# Patient Record
Sex: FEMALE | Race: WHITE | NOT HISPANIC OR LATINO
[De-identification: names, ages, dates, MRNs, and addresses within clinical notes are randomized per-mention and may not be internally consistent; named-entity substitution may affect disease eponyms.]

---

## 2023-09-13 PROBLEM — Z00.00 ENCOUNTER FOR PREVENTIVE HEALTH EXAMINATION: Status: ACTIVE | Noted: 2023-09-13

## 2023-09-18 ENCOUNTER — ASOB RESULT (OUTPATIENT)
Age: 33
End: 2023-09-18

## 2023-09-18 ENCOUNTER — APPOINTMENT (OUTPATIENT)
Dept: ANTEPARTUM | Facility: CLINIC | Age: 33
End: 2023-09-18
Payer: COMMERCIAL

## 2023-09-18 ENCOUNTER — TRANSCRIPTION ENCOUNTER (OUTPATIENT)
Age: 33
End: 2023-09-18

## 2023-09-18 PROCEDURE — 76811 OB US DETAILED SNGL FETUS: CPT

## 2024-01-24 ENCOUNTER — INPATIENT (INPATIENT)
Facility: HOSPITAL | Age: 34
LOS: 1 days | Discharge: HOME | End: 2024-01-26
Attending: OBSTETRICS & GYNECOLOGY | Admitting: OBSTETRICS & GYNECOLOGY
Payer: COMMERCIAL

## 2024-01-24 ENCOUNTER — TRANSCRIPTION ENCOUNTER (OUTPATIENT)
Age: 34
End: 2024-01-24

## 2024-01-24 VITALS — SYSTOLIC BLOOD PRESSURE: 134 MMHG | DIASTOLIC BLOOD PRESSURE: 89 MMHG | OXYGEN SATURATION: 99 % | HEART RATE: 99 BPM

## 2024-01-24 DIAGNOSIS — R03.0 ELEVATED BLOOD-PRESSURE READING, WITHOUT DIAGNOSIS OF HYPERTENSION: ICD-10-CM

## 2024-01-24 DIAGNOSIS — O26.899 OTHER SPECIFIED PREGNANCY RELATED CONDITIONS, UNSPECIFIED TRIMESTER: ICD-10-CM

## 2024-01-24 DIAGNOSIS — Z34.80 ENCOUNTER FOR SUPERVISION OF OTHER NORMAL PREGNANCY, UNSPECIFIED TRIMESTER: ICD-10-CM

## 2024-01-24 LAB
ALBUMIN SERPL ELPH-MCNC: 3.9 G/DL — SIGNIFICANT CHANGE UP (ref 3.3–5)
ALP SERPL-CCNC: 105 U/L — SIGNIFICANT CHANGE UP (ref 40–120)
ALT FLD-CCNC: 22 U/L — SIGNIFICANT CHANGE UP (ref 10–45)
ANION GAP SERPL CALC-SCNC: 13 MMOL/L — SIGNIFICANT CHANGE UP (ref 5–17)
APPEARANCE UR: ABNORMAL
APTT BLD: 25.4 SEC — SIGNIFICANT CHANGE UP (ref 24.5–35.6)
AST SERPL-CCNC: 22 U/L — SIGNIFICANT CHANGE UP (ref 10–40)
BACTERIA # UR AUTO: ABNORMAL /HPF
BASOPHILS # BLD AUTO: 0.01 K/UL — SIGNIFICANT CHANGE UP (ref 0–0.2)
BASOPHILS NFR BLD AUTO: 0.1 % — SIGNIFICANT CHANGE UP (ref 0–2)
BILIRUB SERPL-MCNC: 0.4 MG/DL — SIGNIFICANT CHANGE UP (ref 0.2–1.2)
BILIRUB UR-MCNC: ABNORMAL
BUN SERPL-MCNC: 7 MG/DL — SIGNIFICANT CHANGE UP (ref 7–23)
CALCIUM SERPL-MCNC: 8.8 MG/DL — SIGNIFICANT CHANGE UP (ref 8.4–10.5)
CAST: 4 /LPF — SIGNIFICANT CHANGE UP (ref 0–4)
CHLORIDE SERPL-SCNC: 106 MMOL/L — SIGNIFICANT CHANGE UP (ref 96–108)
CO2 SERPL-SCNC: 19 MMOL/L — LOW (ref 22–31)
COD CRY URNS QL: PRESENT
COLOR SPEC: SIGNIFICANT CHANGE UP
CREAT ?TM UR-MCNC: 268 MG/DL — SIGNIFICANT CHANGE UP
CREAT SERPL-MCNC: 0.5 MG/DL — SIGNIFICANT CHANGE UP (ref 0.5–1.3)
DIFF PNL FLD: ABNORMAL
EGFR: 128 ML/MIN/1.73M2 — SIGNIFICANT CHANGE UP
EOSINOPHIL # BLD AUTO: 0.08 K/UL — SIGNIFICANT CHANGE UP (ref 0–0.5)
EOSINOPHIL NFR BLD AUTO: 0.9 % — SIGNIFICANT CHANGE UP (ref 0–6)
FIBRINOGEN PPP-MCNC: 442 MG/DL — SIGNIFICANT CHANGE UP (ref 200–445)
GLUCOSE SERPL-MCNC: 103 MG/DL — HIGH (ref 70–99)
GLUCOSE UR QL: NEGATIVE MG/DL — SIGNIFICANT CHANGE UP
HCT VFR BLD CALC: 36.2 % — SIGNIFICANT CHANGE UP (ref 34.5–45)
HGB BLD-MCNC: 12.7 G/DL — SIGNIFICANT CHANGE UP (ref 11.5–15.5)
IMM GRANULOCYTES NFR BLD AUTO: 0.6 % — SIGNIFICANT CHANGE UP (ref 0–0.9)
INR BLD: 1.03 RATIO — SIGNIFICANT CHANGE UP (ref 0.85–1.18)
KETONES UR-MCNC: >=160 MG/DL
LDH SERPL L TO P-CCNC: 143 U/L — SIGNIFICANT CHANGE UP (ref 50–242)
LEUKOCYTE ESTERASE UR-ACNC: ABNORMAL
LYMPHOCYTES # BLD AUTO: 1.34 K/UL — SIGNIFICANT CHANGE UP (ref 1–3.3)
LYMPHOCYTES # BLD AUTO: 15 % — SIGNIFICANT CHANGE UP (ref 13–44)
MCHC RBC-ENTMCNC: 31.1 PG — SIGNIFICANT CHANGE UP (ref 27–34)
MCHC RBC-ENTMCNC: 35.1 GM/DL — SIGNIFICANT CHANGE UP (ref 32–36)
MCV RBC AUTO: 88.7 FL — SIGNIFICANT CHANGE UP (ref 80–100)
MONOCYTES # BLD AUTO: 0.76 K/UL — SIGNIFICANT CHANGE UP (ref 0–0.9)
MONOCYTES NFR BLD AUTO: 8.5 % — SIGNIFICANT CHANGE UP (ref 2–14)
NEUTROPHILS # BLD AUTO: 6.69 K/UL — SIGNIFICANT CHANGE UP (ref 1.8–7.4)
NEUTROPHILS NFR BLD AUTO: 74.9 % — SIGNIFICANT CHANGE UP (ref 43–77)
NITRITE UR-MCNC: NEGATIVE — SIGNIFICANT CHANGE UP
NRBC # BLD: 0 /100 WBCS — SIGNIFICANT CHANGE UP (ref 0–0)
PH UR: 6 — SIGNIFICANT CHANGE UP (ref 5–8)
PLATELET # BLD AUTO: 173 K/UL — SIGNIFICANT CHANGE UP (ref 150–400)
POTASSIUM SERPL-MCNC: 3.5 MMOL/L — SIGNIFICANT CHANGE UP (ref 3.5–5.3)
POTASSIUM SERPL-SCNC: 3.5 MMOL/L — SIGNIFICANT CHANGE UP (ref 3.5–5.3)
PROT ?TM UR-MCNC: 64 MG/DL — HIGH (ref 0–12)
PROT SERPL-MCNC: 6.7 G/DL — SIGNIFICANT CHANGE UP (ref 6–8.3)
PROT UR-MCNC: 30 MG/DL
PROT/CREAT UR-RTO: 0.2 RATIO — SIGNIFICANT CHANGE UP (ref 0–0.2)
PROTHROM AB SERPL-ACNC: 10.8 SEC — SIGNIFICANT CHANGE UP (ref 9.5–13)
RBC # BLD: 4.08 M/UL — SIGNIFICANT CHANGE UP (ref 3.8–5.2)
RBC # FLD: 14.2 % — SIGNIFICANT CHANGE UP (ref 10.3–14.5)
RBC CASTS # UR COMP ASSIST: 7 /HPF — HIGH (ref 0–4)
REVIEW: SIGNIFICANT CHANGE UP
SODIUM SERPL-SCNC: 138 MMOL/L — SIGNIFICANT CHANGE UP (ref 135–145)
SP GR SPEC: 1.02 — SIGNIFICANT CHANGE UP (ref 1–1.03)
SQUAMOUS # UR AUTO: 3 /HPF — SIGNIFICANT CHANGE UP (ref 0–5)
URATE SERPL-MCNC: 5.2 MG/DL — SIGNIFICANT CHANGE UP (ref 2.5–7)
UROBILINOGEN FLD QL: 1 MG/DL — SIGNIFICANT CHANGE UP (ref 0.2–1)
WBC # BLD: 8.93 K/UL — SIGNIFICANT CHANGE UP (ref 3.8–10.5)
WBC # FLD AUTO: 8.93 K/UL — SIGNIFICANT CHANGE UP (ref 3.8–10.5)
WBC UR QL: 5 /HPF — SIGNIFICANT CHANGE UP (ref 0–5)

## 2024-01-24 PROCEDURE — 86077 PHYS BLOOD BANK SERV XMATCH: CPT

## 2024-01-24 RX ORDER — OXYTOCIN 10 UNIT/ML
333.33 VIAL (ML) INJECTION
Qty: 20 | Refills: 0 | Status: DISCONTINUED | OUTPATIENT
Start: 2024-01-24 | End: 2024-01-24

## 2024-01-24 RX ORDER — KETOROLAC TROMETHAMINE 30 MG/ML
30 SYRINGE (ML) INJECTION ONCE
Refills: 0 | Status: DISCONTINUED | OUTPATIENT
Start: 2024-01-24 | End: 2024-01-25

## 2024-01-24 RX ORDER — HYDROCORTISONE 1 %
1 OINTMENT (GRAM) TOPICAL EVERY 6 HOURS
Refills: 0 | Status: DISCONTINUED | OUTPATIENT
Start: 2024-01-24 | End: 2024-01-26

## 2024-01-24 RX ORDER — ACETAMINOPHEN 500 MG
975 TABLET ORAL
Refills: 0 | Status: DISCONTINUED | OUTPATIENT
Start: 2024-01-24 | End: 2024-01-26

## 2024-01-24 RX ORDER — BENZOCAINE 10 %
1 GEL (GRAM) MUCOUS MEMBRANE EVERY 6 HOURS
Refills: 0 | Status: DISCONTINUED | OUTPATIENT
Start: 2024-01-24 | End: 2024-01-26

## 2024-01-24 RX ORDER — IBUPROFEN 200 MG
600 TABLET ORAL EVERY 6 HOURS
Refills: 0 | Status: COMPLETED | OUTPATIENT
Start: 2024-01-24 | End: 2024-12-22

## 2024-01-24 RX ORDER — CHLORHEXIDINE GLUCONATE 213 G/1000ML
1 SOLUTION TOPICAL DAILY
Refills: 0 | Status: DISCONTINUED | OUTPATIENT
Start: 2024-01-24 | End: 2024-01-24

## 2024-01-24 RX ORDER — CITRIC ACID/SODIUM CITRATE 300-500 MG
15 SOLUTION, ORAL ORAL EVERY 6 HOURS
Refills: 0 | Status: DISCONTINUED | OUTPATIENT
Start: 2024-01-24 | End: 2024-01-24

## 2024-01-24 RX ORDER — PRAMOXINE HYDROCHLORIDE 150 MG/15G
1 AEROSOL, FOAM RECTAL EVERY 4 HOURS
Refills: 0 | Status: DISCONTINUED | OUTPATIENT
Start: 2024-01-24 | End: 2024-01-26

## 2024-01-24 RX ORDER — TETANUS TOXOID, REDUCED DIPHTHERIA TOXOID AND ACELLULAR PERTUSSIS VACCINE, ADSORBED 5; 2.5; 8; 8; 2.5 [IU]/.5ML; [IU]/.5ML; UG/.5ML; UG/.5ML; UG/.5ML
0.5 SUSPENSION INTRAMUSCULAR ONCE
Refills: 0 | Status: DISCONTINUED | OUTPATIENT
Start: 2024-01-24 | End: 2024-01-26

## 2024-01-24 RX ORDER — SODIUM CHLORIDE 9 MG/ML
500 INJECTION, SOLUTION INTRAVENOUS ONCE
Refills: 0 | Status: COMPLETED | OUTPATIENT
Start: 2024-01-24 | End: 2024-01-24

## 2024-01-24 RX ORDER — DIPHENHYDRAMINE HCL 50 MG
25 CAPSULE ORAL EVERY 6 HOURS
Refills: 0 | Status: DISCONTINUED | OUTPATIENT
Start: 2024-01-24 | End: 2024-01-26

## 2024-01-24 RX ORDER — DIBUCAINE 1 %
1 OINTMENT (GRAM) RECTAL EVERY 6 HOURS
Refills: 0 | Status: DISCONTINUED | OUTPATIENT
Start: 2024-01-24 | End: 2024-01-26

## 2024-01-24 RX ORDER — OXYCODONE HYDROCHLORIDE 5 MG/1
5 TABLET ORAL
Refills: 0 | Status: DISCONTINUED | OUTPATIENT
Start: 2024-01-24 | End: 2024-01-26

## 2024-01-24 RX ORDER — OXYCODONE HYDROCHLORIDE 5 MG/1
5 TABLET ORAL ONCE
Refills: 0 | Status: DISCONTINUED | OUTPATIENT
Start: 2024-01-24 | End: 2024-01-26

## 2024-01-24 RX ORDER — LANOLIN
1 OINTMENT (GRAM) TOPICAL EVERY 6 HOURS
Refills: 0 | Status: DISCONTINUED | OUTPATIENT
Start: 2024-01-24 | End: 2024-01-26

## 2024-01-24 RX ORDER — SODIUM CHLORIDE 9 MG/ML
1000 INJECTION, SOLUTION INTRAVENOUS
Refills: 0 | Status: DISCONTINUED | OUTPATIENT
Start: 2024-01-24 | End: 2024-01-24

## 2024-01-24 RX ORDER — SIMETHICONE 80 MG/1
80 TABLET, CHEWABLE ORAL EVERY 4 HOURS
Refills: 0 | Status: DISCONTINUED | OUTPATIENT
Start: 2024-01-24 | End: 2024-01-26

## 2024-01-24 RX ORDER — AER TRAVELER 0.5 G/1
1 SOLUTION RECTAL; TOPICAL EVERY 4 HOURS
Refills: 0 | Status: DISCONTINUED | OUTPATIENT
Start: 2024-01-24 | End: 2024-01-26

## 2024-01-24 RX ORDER — SODIUM CHLORIDE 9 MG/ML
3 INJECTION INTRAMUSCULAR; INTRAVENOUS; SUBCUTANEOUS EVERY 8 HOURS
Refills: 0 | Status: DISCONTINUED | OUTPATIENT
Start: 2024-01-24 | End: 2024-01-26

## 2024-01-24 RX ORDER — OXYTOCIN 10 UNIT/ML
41.67 VIAL (ML) INJECTION
Qty: 20 | Refills: 0 | Status: DISCONTINUED | OUTPATIENT
Start: 2024-01-24 | End: 2024-01-26

## 2024-01-24 RX ORDER — OXYTOCIN 10 UNIT/ML
4 VIAL (ML) INJECTION
Qty: 30 | Refills: 0 | Status: DISCONTINUED | OUTPATIENT
Start: 2024-01-24 | End: 2024-01-24

## 2024-01-24 RX ORDER — MAGNESIUM HYDROXIDE 400 MG/1
30 TABLET, CHEWABLE ORAL
Refills: 0 | Status: DISCONTINUED | OUTPATIENT
Start: 2024-01-24 | End: 2024-01-26

## 2024-01-24 RX ADMIN — SODIUM CHLORIDE 125 MILLILITER(S): 9 INJECTION, SOLUTION INTRAVENOUS at 11:15

## 2024-01-24 RX ADMIN — SODIUM CHLORIDE 125 MILLILITER(S): 9 INJECTION, SOLUTION INTRAVENOUS at 10:50

## 2024-01-24 RX ADMIN — SODIUM CHLORIDE 500 MILLILITER(S): 9 INJECTION, SOLUTION INTRAVENOUS at 17:45

## 2024-01-24 RX ADMIN — Medication 4 MILLIUNIT(S)/MIN: at 11:55

## 2024-01-24 NOTE — DISCHARGE NOTE OB - CARE PLAN
Assessment and plan of treatment:	Pt had a vaginal delivery of female infant and is stable for discharge on POD   1 Principal Discharge DX:	Normal vaginal delivery  Assessment and plan of treatment:	Pt had a vaginal delivery of female infant and is stable for discharge on POD

## 2024-01-24 NOTE — OB PROVIDER H&P - NSLOWPPHRISK_OBGYN_A_OB
No previous uterine incision/[place] Pregnancy/Less than or equal to 4 previous vaginal births/No known bleeding disorder/No history of postpartum hemorrhage/No other PPH risks indicated

## 2024-01-24 NOTE — OB RN DELIVERY SUMMARY - NSSELHIDDEN_OBGYN_ALL_OB_FT
[NS_DeliveryAttending1_OBGYN_ALL_OB_FT:[id number]],[NS_DeliveryAssist1_OBGYN_ALL_OB_FT:[id number]=],[NS_DeliveryRN_OBGYN_ALL_OB_FT:[id number]=]

## 2024-01-24 NOTE — OB PROVIDER DELIVERY SUMMARY - NSPROVIDERDELIVERYNOTE_OBGYN_ALL_OB_FT
Spontaneous vaginal delivery of liveborn infant over protected peritoneum. Head, shoulders, and body delivered easily. Infant was suctioned. No meconium. 1 minute delayed cord clamping was performed. Cord clamped and cut and infant passed to mother. Placenta delivered intact with a 3 vessel cord. Fundal massage was given and uterine fundus was found to be firm. Vaginal exam revealed an intact cervix, vaginal walls, and sulci. Patient had a 2nd degree laceration in the perineum that was repaired with 2.0 vicryl suture and bilateral labial tears. Excellent hemostasis was noted. Patient was stable and went to recovery. Count was correct x2.     EBL: 400cc   APGAR: 9/9 Spontaneous vaginal delivery of liveborn infant over protected peritoneum. Head, shoulders, and body delivered easily. Infant was suctioned. No meconium. 1 minute delayed cord clamping was performed. Cord clamped and cut and infant passed to mother. Placenta delivered intact with a 3 vessel cord. Fundal massage was given and uterine fundus was found to be firm. Vaginal exam revealed an intact cervix, vaginal walls, and sulci. Patient had a 2nd degree laceration in the perineum that was repaired with 2.0 vicryl suture and bilateral labial tears. Excellent hemostasis was noted. Patient was stable and went to recovery. Count was correct x2.   Bedside sono confirmed no retained membranes    EBL: 400cc   APGAR: 9/9

## 2024-01-24 NOTE — OB PROVIDER DELIVERY SUMMARY - NSSELHIDDEN_OBGYN_ALL_OB_FT
[NS_DeliveryAttending1_OBGYN_ALL_OB_FT:[id number]],[NS_DeliveryAssist1_OBGYN_ALL_OB_FT:[id number]],[NS_DeliveryRN_OBGYN_ALL_OB_FT:[id number]=]

## 2024-01-24 NOTE — OB RN PATIENT PROFILE - INFANT HOME WITH MOTHER, OB PROFILE
Paperwork received for prior auth for Cyclobenzaprine. Done through CoverMyMeds. Awaiting authorization. yes

## 2024-01-24 NOTE — OB PROVIDER H&P - NSHPPHYSICALEXAM_GEN_ALL_CORE
ICU Vital Signs Last 24 Hrs  T(C): 36.7 (24 Jan 2024 10:11), Max: 36.7 (24 Jan 2024 09:19)  T(F): 98.1 (24 Jan 2024 10:11), Max: 98.1 (24 Jan 2024 09:19)  HR: 76 (24 Jan 2024 10:41) (76 - 108)  BP: 119/79 (24 Jan 2024 10:41) (114/76 - 137/93)  RR: 18 (24 Jan 2024 10:11) (17 - 18)  SpO2: 95% (24 Jan 2024 10:29) (93% - 99%)    O2 Parameters below as of 24 Jan 2024 10:11  Patient On (Oxygen Delivery Method): room air  gen: NAD, breathing through contractions  cards: clear S1S2, mild tachycardia, apical 106   pulm: cta b/l   abd: soft, gravid.   ve: 3/80/-2, soft, posterior, engaged vertex    EFM: cat 1   toco: q 5 min

## 2024-01-24 NOTE — OB PROVIDER H&P - PROBLEM SELECTOR PLAN 1
admit  routine labs  Iv access and IV fluids  Bicitra  Cont efm/toco.  Anesthesia consult for epidural.  After comfortable, augmentation w/ pitocin & AROM.   Expectant .

## 2024-01-24 NOTE — OB RN PATIENT PROFILE - NUTRITION PROFILE
Pt arrives with cc of vaginal bleeding with clots today. Pt states she has hx of polycystic ovaries. Pt did receive pap smear today. Pt states she has not had a period in months, denies chance of pregnancy. Pt also endorses stabbing pelvic pain.  
No indicators present

## 2024-01-24 NOTE — DISCHARGE NOTE OB - HOSPITAL COURSE
Patient was admitted for labor augmentation at 39 weeks  She had a vaginal delivery of female infant and is stable for discharge on PPD

## 2024-01-24 NOTE — OB RN DELIVERY SUMMARY - NS_SEPSISRSKCALC_OBGYN_ALL_OB_FT
EOS calculated successfully. EOS Risk Factor: 0.5/1000 live births ([facility] national incidence); GA=39w;Temp=99.5; ROM=10.583; GBS='Negative'; Antibiotics='No antibiotics or any antibiotics < 2 hrs prior to birth'

## 2024-01-24 NOTE — OB PROVIDER H&P - HISTORY OF PRESENT ILLNESS
34 y/o  ANDREW 24 presenting at 39 weeks ga presenting in labor with 7/10 painful contractions every 5 minutes since 3 am.  Pt reports good fetal movement. Denies vb or lof.  GBS neg. EFW 6lb x 2 weeks ago.  PNC uncomplicated.  No h/o elevated BP's in pregnancy though pt reports when she is in the office she is nervous and it is slightly elevated.     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

## 2024-01-24 NOTE — OB PROVIDER LABOR PROGRESS NOTE - NS_SUBJECTIVE/OBJECTIVE_OBGYN_ALL_OB_FT
ELLY AHUMADA Note:    Pt seen and examined for progress. Pt comfortable w/ epidural and ferguson placement. Pt had a few episodes of hypotension which was corrected with ephedrine.   +FM.  Denies VB or LOF.       O:  Vital Signs Last 24 Hrs  T(C): 36.7 (24 Jan 2024 11:28), Max: 36.7 (24 Jan 2024 09:19)  T(F): 98.1 (24 Jan 2024 10:11), Max: 98.1 (24 Jan 2024 09:19)  HR: 90 (24 Jan 2024 11:46) (60 - 110)  BP: 123/73 (24 Jan 2024 11:46) (68/38 - 146/90)  RR: 18 (24 Jan 2024 11:28) (17 - 18)  SpO2: 98% (24 Jan 2024 11:41) (93% - 100%)    Parameters below as of 24 Jan 2024 10:11  Patient On (Oxygen Delivery Method): room air    gen: NAD  abd: soft, gravid. nontender  ve: 4/90/-2  w/ AROM clear fluid

## 2024-01-24 NOTE — DISCHARGE NOTE OB - PATIENT PORTAL LINK FT
You can access the FollowMyHealth Patient Portal offered by [facility] by registering at the following website: [email or web address]. By joining [employer]’s FollowMyHealth portal, you will also be able to view your health information using other applications (apps) compatible with our system.

## 2024-01-24 NOTE — OB PROVIDER LABOR PROGRESS NOTE - ASSESSMENT
begin pitocin augmentation.  cont efm/toco  hellp labs pending - CBC normal.     BRANDYN Hernández
-Anticipate

## 2024-01-24 NOTE — OB PROVIDER H&P - ATTENDING COMMENTS
English
34 y/o  ANDREW 24 presenting at 39 weeks ga presenting in labor with 7/10 painful contractions every 5 minutes since 3 am.  Pt reports good fetal movement. Denies vb or lof.  GBS neg. EFW 6lb x 2 weeks ago.  PNC uncomplicated.      VE was 3 cm on admission  Pt wants labor augmentation    VSS AF  NST:  + accels ,no decels-reactive  toco: ctx q 3 min  HIV NR/RPR/NR/Hep B and C neg  A/P: Labor augmentation at term, neg GBS  -epidural  -pitocin  -consents signed  Adonay Stanton MD

## 2024-01-24 NOTE — DISCHARGE NOTE OB - NS MD DC FALL RISK RISK
For information on Fall & Injury Prevention, visit: [email or web address] OR  [email or web address] OR  https://www.cdc.gov/steadi/patient.html

## 2024-01-24 NOTE — OB RN DELIVERY SUMMARY - NS_RNDELIVATTEST_OBGYN_ALL_OB
Problem: Adult Inpatient Plan of Care  Goal: Plan of Care Review  Outcome: Ongoing (interventions implemented as appropriate)  AAOx3. Bed remains low, locked and call bell within reach. Patient verbalized understanding to call for any needs or assistance. PRN pain medication administered per MD orders. PCA discontinued today. Patient to IR for Thoracentesis and Paracentesis. PT/OT worked with patient. IV antibiotics administered per MD orders. Will continue to monitor patient.         OB Provider reported that the vagina was inspected and no foreign body was found/Laps, needles and instrument count was correct

## 2024-01-24 NOTE — OB PROVIDER LABOR PROGRESS NOTE - NS_SUBJECTIVE/OBJECTIVE_OBGYN_ALL_OB_FT
Patient and seen and examined at bedside, c/o increased rectal pressure.     Vital Signs Last 24 Hrs  T(C): 36.8 (24 Jan 2024 18:16), Max: 37.5 (24 Jan 2024 15:15)  T(F): 98.24 (24 Jan 2024 18:16), Max: 99.5 (24 Jan 2024 15:15)  HR: 97 (24 Jan 2024 18:47) (60 - 110)  BP: 141/86 (24 Jan 2024 18:47) (68/38 - 146/90)  RR: 18 (24 Jan 2024 15:16) (17 - 18)  SpO2: 100% (24 Jan 2024 18:46) (93% - 100%) Patient and seen and examined at bedside, c/o increased rectal pressure. Patient has an epidural in place.     Vital Signs Last 24 Hrs  T(C): 36.8 (24 Jan 2024 18:16), Max: 37.5 (24 Jan 2024 15:15)  T(F): 98.24 (24 Jan 2024 18:16), Max: 99.5 (24 Jan 2024 15:15)  HR: 97 (24 Jan 2024 18:47) (60 - 110)  BP: 141/86 (24 Jan 2024 18:47) (68/38 - 146/90)  RR: 18 (24 Jan 2024 15:16) (17 - 18)  SpO2: 100% (24 Jan 2024 18:46) (93% - 100%)

## 2024-01-24 NOTE — DISCHARGE NOTE OB - CARE PROVIDER_API CALL
Adonay Stanton  Obstetrics and Gynecology  [address], [address]  [place] [address]  Phone: [phone]  Fax: [phone]  Follow Up Time:

## 2024-01-24 NOTE — OB PROVIDER H&P - HIV: DATE, OB PROFILE
Chief Complaint   Patient presents with     Musculoskeletal Problem     Patient complains of boil on left leg        Initial /89 (BP Location: Left arm, Patient Position: Chair, Cuff Size: Adult Regular)  Pulse 126  Temp 98.6  F (37  C) (Oral)  Wt 204 lb 9.6 oz (92.8 kg)  SpO2 94% There is no height or weight on file to calculate BMI.  Medication Reconciliation: complete       Maryanne Herman     04-Jan-2024

## 2024-01-24 NOTE — OB PROVIDER IHI INDUCTION/AUGMENTATION NOTE - NSCHECKLIST_OBGYN_ALL_OB_CAL
5 Otolaryngologist Procedure Text (A): After obtaining clear surgical margins the patient was sent to otolaryngology for surgical repair.  The patient understands they will receive post-surgical care and follow-up from the referring physician's office.

## 2024-01-24 NOTE — PRE-ANESTHESIA EVALUATION ADULT - NSANTHAGERD_ENT_A_CORE
Patient is currently pregnant, in a methadone program and she has not had a potassium run since May.  Will discuss with Dr. Montano.   No

## 2024-01-24 NOTE — OB RN PATIENT PROFILE - WEIGHT: PREPREGNANCY IN LBS
Anesthesia Evaluation     NPO Solid Status: > 8 hours  NPO Liquid Status: < 2 hours           Airway   Mallampati: III  TM distance: <3 FB  Neck ROM: full  Difficult intubation highly probable  Dental - normal exam     Pulmonary - normal exam   Cardiovascular - normal exam        Neuro/Psych  GI/Hepatic/Renal/Endo    (+) morbid obesity,      Musculoskeletal     Abdominal   (+) obese,    Substance History      OB/GYN          Other                        Anesthesia Plan    ASA 2     epidural     Anesthetic plan and risks discussed with patient.      
132

## 2024-01-25 LAB — KLEIHAUER-BETKE CALCULATION: 0 % — SIGNIFICANT CHANGE UP (ref 0–0.2)

## 2024-01-25 RX ORDER — IBUPROFEN 200 MG
600 TABLET ORAL EVERY 6 HOURS
Refills: 0 | Status: DISCONTINUED | OUTPATIENT
Start: 2024-01-25 | End: 2024-01-26

## 2024-01-25 RX ADMIN — Medication 30 MILLIGRAM(S): at 00:33

## 2024-01-25 RX ADMIN — SODIUM CHLORIDE 3 MILLILITER(S): 9 INJECTION INTRAMUSCULAR; INTRAVENOUS; SUBCUTANEOUS at 06:11

## 2024-01-25 RX ADMIN — Medication 600 MILLIGRAM(S): at 17:35

## 2024-01-25 RX ADMIN — Medication 600 MILLIGRAM(S): at 18:05

## 2024-01-25 NOTE — PROGRESS NOTE ADULT - ASSESSMENT
32y/o  PPD#1 from  c/b gHTN. Overall, patient stable and recovering well postpartum.    #L Leg Numbness  - Anesthesia will re-eval    #gHTN  - BPs overnight wnl  - denies severe features   - baseline HELLP labs wnl, P/C 0.2; repeat prn   - no antihypertensives on board  - continue to monitor     #Postpartum state  - Continue with po analgesia  - Increase ambulation  - Continue regular diet  - IV lock  - No labs    Nancy Wheat  PGY-1

## 2024-01-25 NOTE — PROGRESS NOTE ADULT - ATTENDING COMMENTS
PT AMBULATING. DOING WELL  VSS  FIRM FUNDUS.  PPD # 1/2  STABLE  PT DELIVERED LATE LAST EVENING SO ANTICIPATE D/C IN THE MORNING    J LENARDARO

## 2024-01-25 NOTE — PROGRESS NOTE ADULT - SUBJECTIVE AND OBJECTIVE BOX
R1 Progress Note    Patient seen and examined at bedside while still in PACU, no acute overnight events. No acute complaints, pain well controlled. Still feeling like her left leg is numb and weak. Patient is not yet ambulating due to her leg, and has not voided since she delivered. She is tolerating regular diet. Denies CP, SOB, N/V, HA, vision changes. Bleeding minimal.    Vital Signs Last 24 Hours  T(C): 36.9 (01-25-24 @ 01:00), Max: 37.9 (01-24-24 @ 22:55)  HR: 88 (01-25-24 @ 03:00) (60 - 122)  BP: 113/65 (01-25-24 @ 03:00) (68/38 - 146/90)  RR: 18 (01-25-24 @ 01:30) (17 - 18)  SpO2: 97% (01-25-24 @ 03:00) (90% - 100%)    Physical Exam:  General: NAD  Abdomen: Soft, non-tender, non-distended, fundus firm  Extremities: decreased strength and sensation left thigh, normal strength and sensation in right and left lower extremities                     Pelvic: Lochia wnl    Labs:    Blood Type: A Negative  Antibody Screen: Positive               12.7   8.93  )-----------( 173      ( 01-24 @ 11:25 )             36.2         MEDICATIONS  (STANDING):  acetaminophen     Tablet .. 975 milliGRAM(s) Oral <User Schedule>  diphtheria/tetanus/pertussis (acellular) Vaccine (Adacel) 0.5 milliLiter(s) IntraMuscular once  ibuprofen  Tablet. 600 milliGRAM(s) Oral every 6 hours  oxytocin Infusion 41.667 milliUNIT(s)/Min (125 mL/Hr) IV Continuous <Continuous>  prenatal multivitamin 1 Tablet(s) Oral daily  sodium chloride 0.9% lock flush 3 milliLiter(s) IV Push every 8 hours    MEDICATIONS  (PRN):  benzocaine 20%/menthol 0.5% Spray 1 Spray(s) Topical every 6 hours PRN for Perineal discomfort  dibucaine 1% Ointment 1 Application(s) Topical every 6 hours PRN Perineal discomfort  diphenhydrAMINE 25 milliGRAM(s) Oral every 6 hours PRN Pruritus  hydrocortisone 1% Cream 1 Application(s) Topical every 6 hours PRN Moderate Pain (4-6)  lanolin Ointment 1 Application(s) Topical every 6 hours PRN nipple soreness  magnesium hydroxide Suspension 30 milliLiter(s) Oral two times a day PRN Constipation  oxyCODONE    IR 5 milliGRAM(s) Oral every 3 hours PRN Moderate to Severe Pain (4-10)  oxyCODONE    IR 5 milliGRAM(s) Oral once PRN Moderate to Severe Pain (4-10)  pramoxine 1%/zinc 5% Cream 1 Application(s) Topical every 4 hours PRN Moderate Pain (4-6)  simethicone 80 milliGRAM(s) Chew every 4 hours PRN Gas  witch hazel Pads 1 Application(s) Topical every 4 hours PRN Perineal discomfort

## 2024-01-26 VITALS
DIASTOLIC BLOOD PRESSURE: 67 MMHG | SYSTOLIC BLOOD PRESSURE: 103 MMHG | RESPIRATION RATE: 18 BRPM | OXYGEN SATURATION: 98 % | TEMPERATURE: 98 F | HEART RATE: 59 BPM

## 2024-01-26 LAB — T PALLIDUM AB TITR SER: NEGATIVE — SIGNIFICANT CHANGE UP

## 2024-01-26 PROCEDURE — 85460 HEMOGLOBIN FETAL: CPT

## 2024-01-26 PROCEDURE — 85730 THROMBOPLASTIN TIME PARTIAL: CPT

## 2024-01-26 PROCEDURE — 85025 COMPLETE CBC W/AUTO DIFF WBC: CPT

## 2024-01-26 PROCEDURE — 85384 FIBRINOGEN ACTIVITY: CPT

## 2024-01-26 PROCEDURE — 86850 RBC ANTIBODY SCREEN: CPT

## 2024-01-26 PROCEDURE — 83615 LACTATE (LD) (LDH) ENZYME: CPT

## 2024-01-26 PROCEDURE — 85610 PROTHROMBIN TIME: CPT

## 2024-01-26 PROCEDURE — 36415 COLL VENOUS BLD VENIPUNCTURE: CPT

## 2024-01-26 PROCEDURE — 86870 RBC ANTIBODY IDENTIFICATION: CPT

## 2024-01-26 PROCEDURE — 84156 ASSAY OF PROTEIN URINE: CPT

## 2024-01-26 PROCEDURE — 86901 BLOOD TYPING SEROLOGIC RH(D): CPT

## 2024-01-26 PROCEDURE — 86900 BLOOD TYPING SEROLOGIC ABO: CPT

## 2024-01-26 PROCEDURE — 84550 ASSAY OF BLOOD/URIC ACID: CPT

## 2024-01-26 PROCEDURE — 81001 URINALYSIS AUTO W/SCOPE: CPT

## 2024-01-26 PROCEDURE — 82570 ASSAY OF URINE CREATININE: CPT

## 2024-01-26 PROCEDURE — 59050 FETAL MONITOR W/REPORT: CPT

## 2024-01-26 PROCEDURE — 86780 TREPONEMA PALLIDUM: CPT

## 2024-01-26 PROCEDURE — 80053 COMPREHEN METABOLIC PANEL: CPT

## 2024-01-26 RX ORDER — DIBUCAINE 1 %
1 OINTMENT (GRAM) RECTAL
Qty: 0 | Refills: 0 | DISCHARGE
Start: 2024-01-26

## 2024-01-26 RX ORDER — IBUPROFEN 200 MG
1 TABLET ORAL
Qty: 0 | Refills: 0 | DISCHARGE
Start: 2024-01-26

## 2024-01-26 RX ORDER — ACETAMINOPHEN 500 MG
3 TABLET ORAL
Qty: 0 | Refills: 0 | DISCHARGE
Start: 2024-01-26

## 2024-01-26 RX ADMIN — Medication 600 MILLIGRAM(S): at 12:29

## 2024-01-26 RX ADMIN — Medication 975 MILLIGRAM(S): at 08:29

## 2024-01-26 NOTE — PROGRESS NOTE ADULT - SUBJECTIVE AND OBJECTIVE BOX
R1 Progress Note    Patient seen and examined at bedside, no acute overnight events. No acute complaints, pain well controlled. Patient is ambulating, voiding spontaneously, passing gas, and tolerating regular diet. Denies CP, SOB, N/V, HA, blurred vision. Bleeding minimal.    Vital Signs Last 24 Hours  T(C): 36.7 (01-25-24 @ 17:38), Max: 36.7 (01-25-24 @ 05:30)  HR: 83 (01-25-24 @ 17:38) (77 - 94)  BP: 121/86 (01-25-24 @ 17:38) (115/73 - 121/86)  RR: 18 (01-25-24 @ 17:38) (18 - 18)  SpO2: 98% (01-25-24 @ 17:38) (98% - 100%)    Physical Exam:  General: NAD  Abdomen: Soft, non-tender, non-distended, fundus firm  Pelvic: Lochia wnl    Labs:    Blood Type: A Negative  Antibody Screen: Positive  RPR: Negative               12.7   8.93  )-----------( 173      ( 01-24 @ 11:25 )             36.2         MEDICATIONS  (STANDING):  acetaminophen     Tablet .. 975 milliGRAM(s) Oral <User Schedule>  diphtheria/tetanus/pertussis (acellular) Vaccine (Adacel) 0.5 milliLiter(s) IntraMuscular once  ibuprofen  Tablet. 600 milliGRAM(s) Oral every 6 hours  oxytocin Infusion 41.667 milliUNIT(s)/Min (125 mL/Hr) IV Continuous <Continuous>  prenatal multivitamin 1 Tablet(s) Oral daily  sodium chloride 0.9% lock flush 3 milliLiter(s) IV Push every 8 hours    MEDICATIONS  (PRN):  benzocaine 20%/menthol 0.5% Spray 1 Spray(s) Topical every 6 hours PRN for Perineal discomfort  dibucaine 1% Ointment 1 Application(s) Topical every 6 hours PRN Perineal discomfort  diphenhydrAMINE 25 milliGRAM(s) Oral every 6 hours PRN Pruritus  hydrocortisone 1% Cream 1 Application(s) Topical every 6 hours PRN Moderate Pain (4-6)  lanolin Ointment 1 Application(s) Topical every 6 hours PRN nipple soreness  magnesium hydroxide Suspension 30 milliLiter(s) Oral two times a day PRN Constipation  oxyCODONE    IR 5 milliGRAM(s) Oral every 3 hours PRN Moderate to Severe Pain (4-10)  oxyCODONE    IR 5 milliGRAM(s) Oral once PRN Moderate to Severe Pain (4-10)  pramoxine 1%/zinc 5% Cream 1 Application(s) Topical every 4 hours PRN Moderate Pain (4-6)  simethicone 80 milliGRAM(s) Chew every 4 hours PRN Gas  witch hazel Pads 1 Application(s) Topical every 4 hours PRN Perineal discomfort

## 2024-01-26 NOTE — PROGRESS NOTE ADULT - ASSESSMENT
32y/o  PPD#2 from  c/b gHTN. Overall, patient stable and recovering well postpartum.    #gHTN  - BPs overnight wnl  - denies severe features   - baseline HELLP labs wnl, P/C 0.2; repeat labs prn   - no antihypertensives on board  - continue to monitor     #Postpartum state  - Continue with po analgesia  - Increase ambulation  - Continue regular diet  - IV lock  - No labs    Nancy Wheat  PGY-1

## 2024-01-26 NOTE — PROGRESS NOTE ADULT - SUBJECTIVE AND OBJECTIVE BOX
PPD#2- ATTENDING NOTE    S: Patient doing well. Minimal lochia. Pain controlled.    O: Vital Signs Last 24 Hrs  T(C): 36.5 (2024 05:18), Max: 36.7 (2024 17:38)  T(F): 97.7 (2024 05:18), Max: 98.1 (2024 17:38)  HR: 59 (2024 05:18) (59 - 84)  BP: 103/67 (2024 05:18) (103/67 - 121/86)  BP(mean): --  RR: 18 (2024 05:18) (18 - 18)  SpO2: 98% (2024 05:18) (98% - 100%)    Parameters below as of 2024 05:18  Patient On (Oxygen Delivery Method): room air        Gen: NAD  Abd: soft, NT, ND, fundus firm below umbilicus  Lochia: moderate  Ext: no tenderness, no hyper reflexia  Voiding well    Labs:    ABO Interpretation: A (24 @ 11:09)  Rh Interpretation: Negative (24 @ 11:09)  ABO Interpretation: A (24 @ 11:09)  Rh Interpretation: Negative (24 @ 11:09)   Antibody ScreenPositive                        12.7   8.93  )-----------( 173      ( 2024 11:25 )             36.2       A: 33y PPD# s/p  doing well.    Plan:  Analgesia prn  Regular diet  Discharge instruction given  F/U 6 weeks  rhogam given    Office [phone]  Dr. Ochoa

## 2024-01-28 ENCOUNTER — INPATIENT (INPATIENT)
Facility: HOSPITAL | Age: 34
LOS: 1 days | Discharge: HOME | DRG: 776 | End: 2024-01-30
Attending: OBSTETRICS & GYNECOLOGY | Admitting: OBSTETRICS & GYNECOLOGY
Payer: COMMERCIAL

## 2024-01-28 VITALS
HEART RATE: 62 BPM | RESPIRATION RATE: 20 BRPM | DIASTOLIC BLOOD PRESSURE: 97 MMHG | HEIGHT: 66 IN | SYSTOLIC BLOOD PRESSURE: 170 MMHG | WEIGHT: 139.99 LBS | OXYGEN SATURATION: 100 % | TEMPERATURE: 98 F

## 2024-01-28 LAB
ALBUMIN SERPL ELPH-MCNC: 3.6 G/DL — SIGNIFICANT CHANGE UP (ref 3.3–5)
ALP SERPL-CCNC: 106 U/L — SIGNIFICANT CHANGE UP (ref 40–120)
ALT FLD-CCNC: 28 U/L — SIGNIFICANT CHANGE UP (ref 10–45)
ANION GAP SERPL CALC-SCNC: 13 MMOL/L — SIGNIFICANT CHANGE UP (ref 5–17)
APTT BLD: 28.1 SEC — SIGNIFICANT CHANGE UP (ref 24.5–35.6)
AST SERPL-CCNC: 20 U/L — SIGNIFICANT CHANGE UP (ref 10–40)
BASOPHILS # BLD AUTO: 0.05 K/UL — SIGNIFICANT CHANGE UP (ref 0–0.2)
BASOPHILS NFR BLD AUTO: 0.4 % — SIGNIFICANT CHANGE UP (ref 0–2)
BILIRUB DIRECT SERPL-MCNC: <0.1 MG/DL — SIGNIFICANT CHANGE UP (ref 0–0.3)
BILIRUB INDIRECT FLD-MCNC: >0.1 MG/DL — LOW (ref 0.2–1)
BILIRUB SERPL-MCNC: 0.2 MG/DL — SIGNIFICANT CHANGE UP (ref 0.2–1.2)
BUN SERPL-MCNC: 10 MG/DL — SIGNIFICANT CHANGE UP (ref 7–23)
CALCIUM SERPL-MCNC: 8.6 MG/DL — SIGNIFICANT CHANGE UP (ref 8.4–10.5)
CHLORIDE SERPL-SCNC: 104 MMOL/L — SIGNIFICANT CHANGE UP (ref 96–108)
CO2 SERPL-SCNC: 21 MMOL/L — LOW (ref 22–31)
CREAT SERPL-MCNC: 0.55 MG/DL — SIGNIFICANT CHANGE UP (ref 0.5–1.3)
EGFR: 125 ML/MIN/1.73M2 — SIGNIFICANT CHANGE UP
EOSINOPHIL # BLD AUTO: 0.38 K/UL — SIGNIFICANT CHANGE UP (ref 0–0.5)
EOSINOPHIL NFR BLD AUTO: 3.4 % — SIGNIFICANT CHANGE UP (ref 0–6)
FIBRINOGEN PPP-MCNC: 401 MG/DL — SIGNIFICANT CHANGE UP (ref 200–445)
GLUCOSE SERPL-MCNC: 92 MG/DL — SIGNIFICANT CHANGE UP (ref 70–99)
HCT VFR BLD CALC: 31.3 % — LOW (ref 34.5–45)
HGB BLD-MCNC: 10.5 G/DL — LOW (ref 11.5–15.5)
IMM GRANULOCYTES NFR BLD AUTO: 0.5 % — SIGNIFICANT CHANGE UP (ref 0–0.9)
INR BLD: 0.99 RATIO — SIGNIFICANT CHANGE UP (ref 0.85–1.18)
LDH SERPL L TO P-CCNC: 186 U/L — SIGNIFICANT CHANGE UP (ref 50–242)
LYMPHOCYTES # BLD AUTO: 27.5 % — SIGNIFICANT CHANGE UP (ref 13–44)
LYMPHOCYTES # BLD AUTO: 3.08 K/UL — SIGNIFICANT CHANGE UP (ref 1–3.3)
MAGNESIUM SERPL-MCNC: 1.9 MG/DL — SIGNIFICANT CHANGE UP (ref 1.6–2.6)
MCHC RBC-ENTMCNC: 30.8 PG — SIGNIFICANT CHANGE UP (ref 27–34)
MCHC RBC-ENTMCNC: 33.5 GM/DL — SIGNIFICANT CHANGE UP (ref 32–36)
MCV RBC AUTO: 91.8 FL — SIGNIFICANT CHANGE UP (ref 80–100)
MONOCYTES # BLD AUTO: 0.68 K/UL — SIGNIFICANT CHANGE UP (ref 0–0.9)
MONOCYTES NFR BLD AUTO: 6.1 % — SIGNIFICANT CHANGE UP (ref 2–14)
NEUTROPHILS # BLD AUTO: 6.97 K/UL — SIGNIFICANT CHANGE UP (ref 1.8–7.4)
NEUTROPHILS NFR BLD AUTO: 62.1 % — SIGNIFICANT CHANGE UP (ref 43–77)
NRBC # BLD: 0 /100 WBCS — SIGNIFICANT CHANGE UP (ref 0–0)
NT-PROBNP SERPL-SCNC: 207 PG/ML — SIGNIFICANT CHANGE UP (ref 0–300)
PLATELET # BLD AUTO: 250 K/UL — SIGNIFICANT CHANGE UP (ref 150–400)
POTASSIUM SERPL-MCNC: 3.2 MMOL/L — LOW (ref 3.5–5.3)
POTASSIUM SERPL-SCNC: 3.2 MMOL/L — LOW (ref 3.5–5.3)
PROT SERPL-MCNC: 6.3 G/DL — SIGNIFICANT CHANGE UP (ref 6–8.3)
PROTHROM AB SERPL-ACNC: 10.4 SEC — SIGNIFICANT CHANGE UP (ref 9.5–13)
RBC # BLD: 3.41 M/UL — LOW (ref 3.8–5.2)
RBC # FLD: 13.9 % — SIGNIFICANT CHANGE UP (ref 10.3–14.5)
SODIUM SERPL-SCNC: 138 MMOL/L — SIGNIFICANT CHANGE UP (ref 135–145)
TROPONIN T, HIGH SENSITIVITY RESULT: <6 NG/L — SIGNIFICANT CHANGE UP (ref 0–51)
URATE SERPL-MCNC: 3.9 MG/DL — SIGNIFICANT CHANGE UP (ref 2.5–7)
WBC # BLD: 11.22 K/UL — HIGH (ref 3.8–10.5)
WBC # FLD AUTO: 11.22 K/UL — HIGH (ref 3.8–10.5)

## 2024-01-28 PROCEDURE — 71045 X-RAY EXAM CHEST 1 VIEW: CPT | Mod: 26

## 2024-01-28 PROCEDURE — 99285 EMERGENCY DEPT VISIT HI MDM: CPT

## 2024-01-28 RX ORDER — BENZOCAINE 10 %
1 GEL (GRAM) MUCOUS MEMBRANE EVERY 6 HOURS
Refills: 0 | Status: DISCONTINUED | OUTPATIENT
Start: 2024-01-28 | End: 2024-01-30

## 2024-01-28 RX ORDER — LANOLIN
1 OINTMENT (GRAM) TOPICAL EVERY 6 HOURS
Refills: 0 | Status: DISCONTINUED | OUTPATIENT
Start: 2024-01-28 | End: 2024-01-30

## 2024-01-28 RX ORDER — HYDRALAZINE HCL 50 MG
10 TABLET ORAL ONCE
Refills: 0 | Status: COMPLETED | OUTPATIENT
Start: 2024-01-28 | End: 2024-01-28

## 2024-01-28 RX ORDER — HYDRALAZINE HCL 50 MG
5 TABLET ORAL ONCE
Refills: 0 | Status: DISCONTINUED | OUTPATIENT
Start: 2024-01-28 | End: 2024-01-28

## 2024-01-28 RX ORDER — HYDROCORTISONE 1 %
1 OINTMENT (GRAM) TOPICAL EVERY 6 HOURS
Refills: 0 | Status: DISCONTINUED | OUTPATIENT
Start: 2024-01-28 | End: 2024-01-30

## 2024-01-28 RX ORDER — SIMETHICONE 80 MG/1
80 TABLET, CHEWABLE ORAL EVERY 4 HOURS
Refills: 0 | Status: DISCONTINUED | OUTPATIENT
Start: 2024-01-28 | End: 2024-01-30

## 2024-01-28 RX ORDER — DIBUCAINE 1 %
1 OINTMENT (GRAM) RECTAL EVERY 6 HOURS
Refills: 0 | Status: DISCONTINUED | OUTPATIENT
Start: 2024-01-28 | End: 2024-01-30

## 2024-01-28 RX ORDER — TETANUS TOXOID, REDUCED DIPHTHERIA TOXOID AND ACELLULAR PERTUSSIS VACCINE, ADSORBED 5; 2.5; 8; 8; 2.5 [IU]/.5ML; [IU]/.5ML; UG/.5ML; UG/.5ML; UG/.5ML
0.5 SUSPENSION INTRAMUSCULAR ONCE
Refills: 0 | Status: DISCONTINUED | OUTPATIENT
Start: 2024-01-28 | End: 2024-01-30

## 2024-01-28 RX ORDER — DIPHENHYDRAMINE HCL 50 MG
25 CAPSULE ORAL EVERY 6 HOURS
Refills: 0 | Status: DISCONTINUED | OUTPATIENT
Start: 2024-01-28 | End: 2024-01-30

## 2024-01-28 RX ORDER — PRAMOXINE HYDROCHLORIDE 150 MG/15G
1 AEROSOL, FOAM RECTAL EVERY 4 HOURS
Refills: 0 | Status: DISCONTINUED | OUTPATIENT
Start: 2024-01-28 | End: 2024-01-30

## 2024-01-28 RX ORDER — MAGNESIUM HYDROXIDE 400 MG/1
30 TABLET, CHEWABLE ORAL
Refills: 0 | Status: DISCONTINUED | OUTPATIENT
Start: 2024-01-28 | End: 2024-01-30

## 2024-01-28 RX ORDER — AER TRAVELER 0.5 G/1
1 SOLUTION RECTAL; TOPICAL EVERY 4 HOURS
Refills: 0 | Status: DISCONTINUED | OUTPATIENT
Start: 2024-01-28 | End: 2024-01-30

## 2024-01-28 RX ORDER — SODIUM CHLORIDE 9 MG/ML
3 INJECTION INTRAMUSCULAR; INTRAVENOUS; SUBCUTANEOUS EVERY 8 HOURS
Refills: 0 | Status: DISCONTINUED | OUTPATIENT
Start: 2024-01-28 | End: 2024-01-30

## 2024-01-28 RX ORDER — ACETAMINOPHEN 500 MG
975 TABLET ORAL EVERY 6 HOURS
Refills: 0 | Status: DISCONTINUED | OUTPATIENT
Start: 2024-01-28 | End: 2024-01-30

## 2024-01-28 RX ORDER — MAGNESIUM SULFATE 500 MG/ML
4 VIAL (ML) INJECTION ONCE
Refills: 0 | Status: COMPLETED | OUTPATIENT
Start: 2024-01-28 | End: 2024-01-28

## 2024-01-28 RX ORDER — IBUPROFEN 200 MG
600 TABLET ORAL EVERY 6 HOURS
Refills: 0 | Status: COMPLETED | OUTPATIENT
Start: 2024-01-28 | End: 2024-12-26

## 2024-01-28 RX ADMIN — Medication 300 GRAM(S): at 23:37

## 2024-01-28 RX ADMIN — Medication 10 MILLIGRAM(S): at 23:35

## 2024-01-28 NOTE — H&P ADULT - ASSESSMENT
A/P: Pt is a 33y  PPD#4 from  c/b gHTN presenting with severe range BPs warranting immediate-release antihypertensives while in the emergency room. Patient started on Magnesium while in the ED and HELLP labs are pending     Mann Morgan, PGY-2  Obstetrics and Gynecology    Discussed with Dr. AL Joe     ***Incomplete note***  A/P: Pt is a 33y  PPD#4 from  c/b gHTN presenting with severe range BPs, meeting criteria for PEC w/ severe features warranting immediate-release antihypertensives while in the emergency room. Patient given Hydralazine 5mg by the ED. HELLP labs are currently pending. Diagnosis of PEC w/ severe features was reviewed with the patient and plan for Mg for seizure prophylaxis. All questions were answered to the patient's apparent satisfaction    Neuro: Ibuprofen and Acetaminophen for pain. Mg to be started for seizure prophylaxis  CV: s/p 5mg of Hydralazine in the ED. Will start Nifedipine 30mg XL qd and continue to monitor BPs  - Continue to monitor chest pain. ECG in the ED reassuring. Will have pt receive Pepcid   Pulm: No acute issues  GI: Regular diet   : Voiding spontaneously. No acute issues   FEN: HELLP labs pending   ID: No acute issues   Endo: No acute issues  Dispo: Admit for Mg and BP control     Mann Morgan, PGY-2   Obstetrics and Gynecology    Discussed with Dr. AL Joe

## 2024-01-28 NOTE — ED PROVIDER NOTE - PHYSICAL EXAMINATION
Gen: Patient is well-appearing, NAD, AAOx3, able to ambulate without assistance  HEENT: NCAT,  normal conjunctiva, tongue midline, oral mucosa moist  Lung: CTAB, no respiratory distress, no wheezes/rhonchi/rales B/L, speaking in full sentences  CV: RRR, no murmurs, rubs or gallops, distal pulses 2+ b/l  Abd: soft, NT, ND, no guarding, no rigidity, no rebound tenderness  MSK: no visible deformities, ROM normal in UE/LE  Neuro: No focal sensory or motor deficits  Skin: Warm, well perfused, no rash, +1 pitting edema b/l LE   Psych: normal affect, calm

## 2024-01-28 NOTE — ED PROVIDER NOTE - ATTENDING CONTRIBUTION TO CARE
Patient 5 days postpartum via , no complications in pregnancy, here with chest pain for the last 2 days, nonpleuritic, nonexertional, along with bilateral leg swelling which she noticed today.  She denies any shortness of breath, nausea, vomiting, dizziness, headache.  On exam patient was found to be hypertensive to 170s in triage and 160s in the ED room, concerning for preeclampsia.  Clinical picture not very concerning for PE given no shortness of breath, no pleurisy, no hypoxia, no tachycardia, no unilateral leg swelling.  Low concern for ACS.  Troponin sent to assess for potential myocarditis.  EKG shows normal sinus rhythm without any ischemic changes or arrhythmias. Preeclampsia labs were sent, patient was treated with hydralazine for blood pressure which normalized her blood pressure.  Patient was given magnesium and admitted to OB/GYN service for further observation and management of suspected preeclampsia.

## 2024-01-28 NOTE — H&P ADULT - HISTORY OF PRESENT ILLNESS
HPI: Pt is a 33y  PPD#4 from [facility] c/b [facility] presenting with complaints of chest discomfort and leg swelling which she noticed prior to arrival     Denies any RUQ pain, shortness of breath, n/v, headaches, or scotomas. Currently formula feeding     [facility]    OBHx: Above  GynHx: Fibroid, reportedly small (discovered during pregnancy)  PMHx: Denies  PSHx: Denies  Med: PNV  All: NKDA  Psych: Denies hx of mental health issues  SH: Denies hx of smoking, drinking, or drug usage during the pregnancy    Vital Signs Last 24 Hrs  T(C): 36.8 (2024 23:05), Max: 36.9 (2024 22:57)  T(F): 98.2 (2024 23:05), Max: 98.4 (2024 22:57)  HR: 60 (2024 23:15) (60 - 76)  BP: 167/96 (2024 23:15) (165/94 - 170/97)  BP(mean): 117 (2024 23:15) (115 - 117)  RR: 18 (2024 23:15) (18 - 20)  SpO2: 100% (2024 23:15) (100% - 100%)    Parameters below as of 2024 23:15  Patient On (Oxygen Delivery Method): room air                 HPI: Pt is a 33y  PPD#4 from [facility] c/b [facility] presenting with complaints of chest discomfort for ~2 days described as a burning and leg swelling, the latter of which prompted her to present for evaluation    Denies any RUQ pain, shortness of breath, n/v, headaches, or scotomas. Currently formula feeding     Intrapartum course c/b [facility]    OBHx: Above  GynHx: Fibroid, reportedly small (discovered during pregnancy)  PMHx: Denies  PSHx: Denies  Med: PNV  All: NKDA  Psych: Denies hx of mental health issues  SH: Denies hx of smoking, drinking, or drug usage during the pregnancy    Vital Signs Last 24 Hrs  T(C): 36.8 (2024 23:05), Max: 36.9 (2024 22:57)  T(F): 98.2 (2024 23:05), Max: 98.4 (2024 22:57)  HR: 60 (2024 23:15) (60 - 76)  BP: 167/96 (2024 23:15) (165/94 - 170/97)  BP(mean): 117 (2024 23:15) (115 - 117)  RR: 18 (2024 23:15) (18 - 20)  SpO2: 100% (2024 23:15) (100% - 100%)    Parameters below as of 2024 23:15  Patient On (Oxygen Delivery Method): room air    Gen: No acute distress. Awake. Alert  CV: Regular rate and rhythm. No murmurs appreciated  Pulm: Clear to auscultation bilaterally. No wheezes, crackles, or rhonchi  Abd: Soft. Fundus is firm  Extremities: Trace pitting edema of the ankles. No calf tenderness bilaterally  Neuro: 2+ brachial reflex

## 2024-01-28 NOTE — ED ADULT TRIAGE NOTE - BMI (KG/M2)
REVIEW OF SYSTEMS:    CONSTITUTIONAL: No fever, weight loss, or fatigue  EYES: No eye pain, visual disturbances, or discharge  ENMT:  No difficulty hearing, tinnitus, vertigo; No sinus or throat pain  NECK: No pain or stiffness  BREASTS: No pain, masses, or nipple discharge  RESPIRATORY: No cough, wheezing, chills or hemoptysis; No shortness of breath  CARDIOVASCULAR: chest pain   GASTROINTESTINAL: No abdominal or epigastric pain. No nausea, vomiting, or hematemesis; No diarrhea or constipation. No melena or hematochezia.  GENITOURINARY: No dysuria, frequency, hematuria, or incontinence  NEUROLOGICAL: mild headache  SKIN: No itching, burning, rashes, or lesions   LYMPH NODES: No enlarged glands  ENDOCRINE: No heat or cold intolerance; No hair loss  MUSCULOSKELETAL: No joint pain or swelling; No muscle, back, or extremity pain  PSYCHIATRIC: anxiety   HEME/LYMPH: No easy bruising, or bleeding gums  ALLERY AND IMMUNOLOGIC: No hives or eczema
22.6

## 2024-01-28 NOTE — ED ADULT NURSE NOTE - OBJECTIVE STATEMENT
Pt is a 33y F 5 days post partum c/o 2 days of chest pain, b/l leg swelling. Pt had uncomplicated vaginal delivery 5 days ago, states she has no hx of HTN during pregnancy. Pt denies fever, chills, cough, ha, dizziness, sob, NVD, abd pain. Pt is A&Ox3, neuro status intact, breathing unlabored and spontaneous, full ROM of all extremities. Pt resting in stretcher, placed on cardiac monitor, IV access obtained, bed in lowest position, aware of plan of care. Comfort and safety measures maintained.

## 2024-01-28 NOTE — ED PROVIDER NOTE - BIRTH SEX
Patient presents with back/side pain since yesterday afternoon, unrelieved by Tylenol. Also states abnormal vaginal bleeding since this morning. States implant is due to come out this year.    Female

## 2024-01-28 NOTE — ED PROVIDER NOTE - OBJECTIVE STATEMENT
33-year-old female, no medical history, 5 days postpartum from uncomplicated , presenting with 2 days onset of chest pain, bilateral leg swelling.  Reports symptoms intermittent with no specific trigger.  Denies fever, shortness of breath, headache, abdominal pain, leg pain.  No prior history of CAD or blood clots.  Pregnancy was uncomplicated with no history of hypertension or preeclampsia.

## 2024-01-28 NOTE — ED PROVIDER NOTE - CLINICAL SUMMARY MEDICAL DECISION MAKING FREE TEXT BOX
33-year-old female, no medical history, 5 days postpartum from uncomplicated , presenting with 2 days onset of chest pain, bilateral leg swelling.  Reports symptoms intermittent with no specific trigger.  Denies fever, shortness of breath, headache, abdominal pain, leg pain.  No prior history of CAD or blood clots.  Pregnancy was uncomplicated with no history of hypertension or preeclampsia. 33-year-old female, no medical history, 5 days postpartum from uncomplicated , presenting with 2 days onset of chest pain, bilateral leg swelling.  Reports symptoms intermittent with no specific trigger.  Denies fever, shortness of breath, headache, abdominal pain, leg pain.  No prior history of CAD or blood clots.  Pregnancy was uncomplicated with no history of hypertension or preeclampsia. /97, otherwise vital signs within normal limits at triage.  Repeat /94.  Exam patient speaking full sentences, well-appearing, not in acute distress, normal respiratory effort, clear lung exam bilaterally, abdomen soft, nontender, pitting edema noted on bilateral lower extremity.  Differentials include but not limited to postpartum cardiomyopathy versus ACS versus preeclampsia.  Will get labs, OB/GYN consult, magnesium, hydralazine, monitor.

## 2024-01-28 NOTE — ED PROVIDER NOTE - NS ED ROS FT
Constitutional:  (-) fever, (-) chills, (-) lethargy  Eyes:  (-) eye pain (-) visual changes  ENMT: (-) nasal discharge, (-) sore throat. (-) neck pain or stiffness  Cardiac: (+) chest pain (-) palpitations  Respiratory:  (-) cough (-) respiratory distress.   GI:  (-) nausea (-) vomiting (-) diarrhea (-) abdominal pain.  :  (-) dysuria (-) frequency (-) burning.  MS:  (-) back pain (-) joint pain.  Neuro:  (-) headache (-) numbness (-) tingling (-) focal weakness  Skin:  (-) rash (+) leg swelling   Except as documented in the HPI,  all other systems are negative

## 2024-01-28 NOTE — ED PROVIDER NOTE - CARE PLAN
1 Principal Discharge DX:	Chest pain   Principal Discharge DX:	Preeclampsia in postpartum period  Secondary Diagnosis:	Chest pain, midsternal

## 2024-01-29 LAB
ALBUMIN SERPL ELPH-MCNC: 3.5 G/DL — SIGNIFICANT CHANGE UP (ref 3.3–5)
ALP SERPL-CCNC: 95 U/L — SIGNIFICANT CHANGE UP (ref 40–120)
ALT FLD-CCNC: 23 U/L — SIGNIFICANT CHANGE UP (ref 10–45)
ANION GAP SERPL CALC-SCNC: 10 MMOL/L — SIGNIFICANT CHANGE UP (ref 5–17)
APTT BLD: 31.2 SEC — SIGNIFICANT CHANGE UP (ref 24.5–35.6)
AST SERPL-CCNC: 16 U/L — SIGNIFICANT CHANGE UP (ref 10–40)
BASOPHILS # BLD AUTO: 0.04 K/UL — SIGNIFICANT CHANGE UP (ref 0–0.2)
BASOPHILS NFR BLD AUTO: 0.5 % — SIGNIFICANT CHANGE UP (ref 0–2)
BILIRUB SERPL-MCNC: 0.2 MG/DL — SIGNIFICANT CHANGE UP (ref 0.2–1.2)
BUN SERPL-MCNC: 5 MG/DL — LOW (ref 7–23)
CALCIUM SERPL-MCNC: 7.6 MG/DL — LOW (ref 8.4–10.5)
CHLORIDE SERPL-SCNC: 106 MMOL/L — SIGNIFICANT CHANGE UP (ref 96–108)
CO2 SERPL-SCNC: 23 MMOL/L — SIGNIFICANT CHANGE UP (ref 22–31)
CREAT SERPL-MCNC: 0.44 MG/DL — LOW (ref 0.5–1.3)
EGFR: 132 ML/MIN/1.73M2 — SIGNIFICANT CHANGE UP
EOSINOPHIL # BLD AUTO: 0.23 K/UL — SIGNIFICANT CHANGE UP (ref 0–0.5)
EOSINOPHIL NFR BLD AUTO: 2.6 % — SIGNIFICANT CHANGE UP (ref 0–6)
FIBRINOGEN PPP-MCNC: 373 MG/DL — SIGNIFICANT CHANGE UP (ref 200–445)
GLUCOSE SERPL-MCNC: 90 MG/DL — SIGNIFICANT CHANGE UP (ref 70–99)
HCT VFR BLD CALC: 29.2 % — LOW (ref 34.5–45)
HGB BLD-MCNC: 9.9 G/DL — LOW (ref 11.5–15.5)
IMM GRANULOCYTES NFR BLD AUTO: 0.8 % — SIGNIFICANT CHANGE UP (ref 0–0.9)
INR BLD: 0.97 RATIO — SIGNIFICANT CHANGE UP (ref 0.85–1.18)
LDH SERPL L TO P-CCNC: 151 U/L — SIGNIFICANT CHANGE UP (ref 50–242)
LYMPHOCYTES # BLD AUTO: 2.38 K/UL — SIGNIFICANT CHANGE UP (ref 1–3.3)
LYMPHOCYTES # BLD AUTO: 27.4 % — SIGNIFICANT CHANGE UP (ref 13–44)
MAGNESIUM SERPL-MCNC: 4.9 MG/DL — HIGH (ref 1.6–2.6)
MAGNESIUM SERPL-MCNC: 5.5 MG/DL — HIGH (ref 1.6–2.6)
MAGNESIUM SERPL-MCNC: 5.7 MG/DL — HIGH (ref 1.6–2.6)
MCHC RBC-ENTMCNC: 30.7 PG — SIGNIFICANT CHANGE UP (ref 27–34)
MCHC RBC-ENTMCNC: 33.9 GM/DL — SIGNIFICANT CHANGE UP (ref 32–36)
MCV RBC AUTO: 90.4 FL — SIGNIFICANT CHANGE UP (ref 80–100)
MONOCYTES # BLD AUTO: 0.57 K/UL — SIGNIFICANT CHANGE UP (ref 0–0.9)
MONOCYTES NFR BLD AUTO: 6.6 % — SIGNIFICANT CHANGE UP (ref 2–14)
NEUTROPHILS # BLD AUTO: 5.39 K/UL — SIGNIFICANT CHANGE UP (ref 1.8–7.4)
NEUTROPHILS NFR BLD AUTO: 62.1 % — SIGNIFICANT CHANGE UP (ref 43–77)
NRBC # BLD: 0 /100 WBCS — SIGNIFICANT CHANGE UP (ref 0–0)
PLATELET # BLD AUTO: 223 K/UL — SIGNIFICANT CHANGE UP (ref 150–400)
POTASSIUM SERPL-MCNC: 3 MMOL/L — LOW (ref 3.5–5.3)
POTASSIUM SERPL-SCNC: 3 MMOL/L — LOW (ref 3.5–5.3)
PROT SERPL-MCNC: 5.9 G/DL — LOW (ref 6–8.3)
PROTHROM AB SERPL-ACNC: 10.2 SEC — SIGNIFICANT CHANGE UP (ref 9.5–13)
RBC # BLD: 3.23 M/UL — LOW (ref 3.8–5.2)
RBC # FLD: 13.8 % — SIGNIFICANT CHANGE UP (ref 10.3–14.5)
SODIUM SERPL-SCNC: 139 MMOL/L — SIGNIFICANT CHANGE UP (ref 135–145)
URATE SERPL-MCNC: 3.7 MG/DL — SIGNIFICANT CHANGE UP (ref 2.5–7)
WBC # BLD: 8.68 K/UL — SIGNIFICANT CHANGE UP (ref 3.8–10.5)
WBC # FLD AUTO: 8.68 K/UL — SIGNIFICANT CHANGE UP (ref 3.8–10.5)

## 2024-01-29 PROCEDURE — 93306 TTE W/DOPPLER COMPLETE: CPT | Mod: 26

## 2024-01-29 RX ORDER — MAGNESIUM SULFATE 500 MG/ML
2 VIAL (ML) INJECTION
Qty: 40 | Refills: 0 | Status: DISCONTINUED | OUTPATIENT
Start: 2024-01-29 | End: 2024-01-29

## 2024-01-29 RX ORDER — MAGNESIUM SULFATE 500 MG/ML
2 VIAL (ML) INJECTION
Qty: 40 | Refills: 0 | Status: DISCONTINUED | OUTPATIENT
Start: 2024-01-29 | End: 2024-01-30

## 2024-01-29 RX ORDER — NIFEDIPINE 30 MG
30 TABLET, EXTENDED RELEASE 24 HR ORAL DAILY
Refills: 0 | Status: DISCONTINUED | OUTPATIENT
Start: 2024-01-29 | End: 2024-01-30

## 2024-01-29 RX ORDER — SODIUM CHLORIDE 9 MG/ML
1000 INJECTION, SOLUTION INTRAVENOUS
Refills: 0 | Status: DISCONTINUED | OUTPATIENT
Start: 2024-01-29 | End: 2024-01-30

## 2024-01-29 RX ORDER — IBUPROFEN 200 MG
600 TABLET ORAL EVERY 6 HOURS
Refills: 0 | Status: DISCONTINUED | OUTPATIENT
Start: 2024-01-29 | End: 2024-01-30

## 2024-01-29 RX ORDER — FAMOTIDINE 10 MG/ML
20 INJECTION INTRAVENOUS ONCE
Refills: 0 | Status: COMPLETED | OUTPATIENT
Start: 2024-01-29 | End: 2024-01-29

## 2024-01-29 RX ADMIN — Medication 975 MILLIGRAM(S): at 22:15

## 2024-01-29 RX ADMIN — Medication 1 TABLET(S): at 11:59

## 2024-01-29 RX ADMIN — SODIUM CHLORIDE 50 MILLILITER(S): 9 INJECTION, SOLUTION INTRAVENOUS at 01:00

## 2024-01-29 RX ADMIN — SODIUM CHLORIDE 50 MILLILITER(S): 9 INJECTION, SOLUTION INTRAVENOUS at 19:21

## 2024-01-29 RX ADMIN — Medication 50 GM/HR: at 19:22

## 2024-01-29 RX ADMIN — SODIUM CHLORIDE 3 MILLILITER(S): 9 INJECTION INTRAMUSCULAR; INTRAVENOUS; SUBCUTANEOUS at 13:30

## 2024-01-29 RX ADMIN — Medication 975 MILLIGRAM(S): at 08:53

## 2024-01-29 RX ADMIN — Medication 975 MILLIGRAM(S): at 02:50

## 2024-01-29 RX ADMIN — Medication 30 MILLIGRAM(S): at 08:17

## 2024-01-29 RX ADMIN — FAMOTIDINE 20 MILLIGRAM(S): 10 INJECTION INTRAVENOUS at 01:16

## 2024-01-29 RX ADMIN — Medication 50 GM/HR: at 01:00

## 2024-01-29 RX ADMIN — Medication 975 MILLIGRAM(S): at 03:50

## 2024-01-29 RX ADMIN — Medication 975 MILLIGRAM(S): at 09:45

## 2024-01-29 RX ADMIN — Medication 975 MILLIGRAM(S): at 17:30

## 2024-01-29 RX ADMIN — Medication 975 MILLIGRAM(S): at 21:40

## 2024-01-29 NOTE — PROGRESS NOTE ADULT - SUBJECTIVE AND OBJECTIVE BOX
OB Progress Note:  PPD#5    S: 32yo  PPD#5 s/p  readmitted with elevated BP meeting criteria for severe pre-ecclampsia. Patient feels well. She is tolerating a regular diet and passing flatus. She is voiding spontaneously, and ambulating without difficulty. Denies CP/SOB. Denies HA/lightheadedness/dizziness. Denies N/V. Denies calf pain    O:  Vitals:   Vital Signs Last 24 Hrs  T(C): 36.7 (2024 02:20), Max: 36.9 (2024 22:57)  T(F): 98 (2024 02:20), Max: 98.4 (2024 22:57)  HR: 85 (2024 04:00) (60 - 95)  BP: 129/80 (2024 04:00) (106/73 - 170/97)  BP(mean): 87 (2024 02:00) (85 - 117)  RR: 18 (2024 04:00) (18 - 20)  SpO2: 98% (2024 04:00) (96% - 100%)    Parameters below as of 2024 04:00  Patient On (Oxygen Delivery Method): room air        MEDICATIONS  (STANDING):  acetaminophen     Tablet .. 975 milliGRAM(s) Oral every 6 hours  diphtheria/tetanus/pertussis (acellular) Vaccine (Adacel) 0.5 milliLiter(s) IntraMuscular once  ibuprofen  Tablet. 600 milliGRAM(s) Oral every 6 hours  lactated ringers. 1000 milliLiter(s) (50 mL/Hr) IV Continuous <Continuous>  magnesium sulfate Infusion 2 Gm/Hr (50 mL/Hr) IV Continuous <Continuous>  NIFEdipine XL 30 milliGRAM(s) Oral daily  prenatal multivitamin 1 Tablet(s) Oral daily  sodium chloride 0.9% lock flush 3 milliLiter(s) IV Push every 8 hours    MEDICATIONS  (PRN):  benzocaine 20%/menthol 0.5% Spray 1 Spray(s) Topical every 6 hours PRN for Perineal discomfort  dibucaine 1% Ointment 1 Application(s) Topical every 6 hours PRN Perineal discomfort  diphenhydrAMINE 25 milliGRAM(s) Oral every 6 hours PRN Pruritus  hydrocortisone 1% Cream 1 Application(s) Topical every 6 hours PRN Moderate Pain (4-6)  lanolin Ointment 1 Application(s) Topical every 6 hours PRN nipple soreness  magnesium hydroxide Suspension 30 milliLiter(s) Oral two times a day PRN Constipation  pramoxine 1%/zinc 5% Cream 1 Application(s) Topical every 4 hours PRN Moderate Pain (4-6)  simethicone 80 milliGRAM(s) Chew every 4 hours PRN Gas  witch hazel Pads 1 Application(s) Topical every 4 hours PRN Perineal discomfort      Labs:  Blood type: A Negative  Rubella IgG: RPR: Negative                          10.5<L>   11.22<H> >-----------< 250    (  @ 23:22 )             31.3<L>    24 @ 23:22      138  |  104  |  10  ----------------------------<  92  3.2<L>   |  21<L>  |  0.55        Ca    8.6      2024 23:22  Mg     1.9         TPro  6.3  /  Alb  3.6  /  TBili  0.2  /  DBili  <0.1  /  AST  20  /  ALT  28  /  AlkPhos  106  24 @ 23:22          Physical Exam:  General: NAD  CV: RRR  Pulm: CTAB  Abdomen: soft, non-tender, non-distended, fundus firm  Vaginal: lochia wnl, exam deferred  Extremities: No erythema/calf tenderness     OB Progress Note:  PPD#5    S: 32yo  PPD#5 s/p  readmitted with elevated BP meeting criteria for severe pre-ecclampsia. Patient continues to reports chest pain. Denies SOB. She is tolerating a regular diet and passing flatus. She is voiding spontaneously, and ambulating without difficulty. Denies HA/lightheadedness/dizziness. Denies N/V. Denies calf pain    O:  Vitals:   Vital Signs Last 24 Hrs  T(C): 36.7 (2024 02:20), Max: 36.9 (2024 22:57)  T(F): 98 (2024 02:20), Max: 98.4 (2024 22:57)  HR: 85 (2024 04:00) (60 - 95)  BP: 129/80 (2024 04:00) (106/73 - 170/97)  BP(mean): 87 (2024 02:00) (85 - 117)  RR: 18 (2024 04:00) (18 - 20)  SpO2: 98% (2024 04:00) (96% - 100%)    Parameters below as of 2024 04:00  Patient On (Oxygen Delivery Method): room air        MEDICATIONS  (STANDING):  acetaminophen     Tablet .. 975 milliGRAM(s) Oral every 6 hours  diphtheria/tetanus/pertussis (acellular) Vaccine (Adacel) 0.5 milliLiter(s) IntraMuscular once  ibuprofen  Tablet. 600 milliGRAM(s) Oral every 6 hours  lactated ringers. 1000 milliLiter(s) (50 mL/Hr) IV Continuous <Continuous>  magnesium sulfate Infusion 2 Gm/Hr (50 mL/Hr) IV Continuous <Continuous>  NIFEdipine XL 30 milliGRAM(s) Oral daily  prenatal multivitamin 1 Tablet(s) Oral daily  sodium chloride 0.9% lock flush 3 milliLiter(s) IV Push every 8 hours    MEDICATIONS  (PRN):  benzocaine 20%/menthol 0.5% Spray 1 Spray(s) Topical every 6 hours PRN for Perineal discomfort  dibucaine 1% Ointment 1 Application(s) Topical every 6 hours PRN Perineal discomfort  diphenhydrAMINE 25 milliGRAM(s) Oral every 6 hours PRN Pruritus  hydrocortisone 1% Cream 1 Application(s) Topical every 6 hours PRN Moderate Pain (4-6)  lanolin Ointment 1 Application(s) Topical every 6 hours PRN nipple soreness  magnesium hydroxide Suspension 30 milliLiter(s) Oral two times a day PRN Constipation  pramoxine 1%/zinc 5% Cream 1 Application(s) Topical every 4 hours PRN Moderate Pain (4-6)  simethicone 80 milliGRAM(s) Chew every 4 hours PRN Gas  witch hazel Pads 1 Application(s) Topical every 4 hours PRN Perineal discomfort      Labs:  Blood type: A Negative  Rubella IgG: RPR: Negative                          10.5<L>   11.22<H> >-----------< 250    (  @ 23:22 )             31.3<L>    24 @ 23:22      138  |  104  |  10  ----------------------------<  92  3.2<L>   |  21<L>  |  0.55        Ca    8.6      2024 23:22  Mg     1.9         TPro  6.3  /  Alb  3.6  /  TBili  0.2  /  DBili  <0.1  /  AST  20  /  ALT  28  /  AlkPhos  106  24 @ 23:22          Physical Exam:  General: NAD  CV: RRR  Pulm: CTAB  Abdomen: soft, non-tender, non-distended, fundus firm  Vaginal: lochia wnl, exam deferred  Extremities: No erythema/calf tenderness     OB Progress Note:  PPD#5    S: 34yo  PPD#5 s/p  readmitted with elevated BP meeting criteria for severe pre-ecclampsia. Patient continues to reports chest pain. Denies SOB. She is tolerating a regular diet and passing flatus. She is voiding spontaneously, and ambulating without difficulty. Denies HA/lightheadedness/dizziness. Denies N/V. Denies calf pain    O:  Vitals:   Vital Signs Last 24 Hrs  T(C): 36.7 (2024 02:20), Max: 36.9 (2024 22:57)  T(F): 98 (2024 02:20), Max: 98.4 (2024 22:57)  HR: 85 (2024 04:00) (60 - 95)  BP: 129/80 (2024 04:00) (106/73 - 170/97)  BP(mean): 87 (2024 02:00) (85 - 117)  RR: 18 (2024 04:00) (18 - 20)  SpO2: 98% (2024 04:00) (96% - 100%)    Parameters below as of 2024 04:00  Patient On (Oxygen Delivery Method): room air        MEDICATIONS  (STANDING):  acetaminophen     Tablet .. 975 milliGRAM(s) Oral every 6 hours  diphtheria/tetanus/pertussis (acellular) Vaccine (Adacel) 0.5 milliLiter(s) IntraMuscular once  ibuprofen  Tablet. 600 milliGRAM(s) Oral every 6 hours  lactated ringers. 1000 milliLiter(s) (50 mL/Hr) IV Continuous <Continuous>  magnesium sulfate Infusion 2 Gm/Hr (50 mL/Hr) IV Continuous <Continuous>  NIFEdipine XL 30 milliGRAM(s) Oral daily  prenatal multivitamin 1 Tablet(s) Oral daily  sodium chloride 0.9% lock flush 3 milliLiter(s) IV Push every 8 hours    MEDICATIONS  (PRN):  benzocaine 20%/menthol 0.5% Spray 1 Spray(s) Topical every 6 hours PRN for Perineal discomfort  dibucaine 1% Ointment 1 Application(s) Topical every 6 hours PRN Perineal discomfort  diphenhydrAMINE 25 milliGRAM(s) Oral every 6 hours PRN Pruritus  hydrocortisone 1% Cream 1 Application(s) Topical every 6 hours PRN Moderate Pain (4-6)  lanolin Ointment 1 Application(s) Topical every 6 hours PRN nipple soreness  magnesium hydroxide Suspension 30 milliLiter(s) Oral two times a day PRN Constipation  pramoxine 1%/zinc 5% Cream 1 Application(s) Topical every 4 hours PRN Moderate Pain (4-6)  simethicone 80 milliGRAM(s) Chew every 4 hours PRN Gas  witch hazel Pads 1 Application(s) Topical every 4 hours PRN Perineal discomfort      Labs:  Blood type: A Negative  Rubella IgG: RPR: Negative                          10.5<L>   11.22<H> >-----------< 250    (  @ 23:22 )             31.3<L>    24 @ 23:22      138  |  104  |  10  ----------------------------<  92  3.2<L>   |  21<L>  |  0.55        Ca    8.6      2024 23:22  Mg     1.9         TPro  6.3  /  Alb  3.6  /  TBili  0.2  /  DBili  <0.1  /  AST  20  /  ALT  28  /  AlkPhos  106  24 @ 23:22          Physical Exam:  General: NAD  CV: RRR, chest non-tender to palpation  Pulm: CTAB  Abdomen: soft, non-tender, non-distended, fundus firm  Vaginal: lochia wnl, exam deferred  Extremities: No erythema/calf tenderness

## 2024-01-29 NOTE — CONSULT NOTE ADULT - NS ATTEND AMEND GEN_ALL_CORE FT
Patinet presented post partum with elevated BP (consisited with pp PEEC) and chest discomfort that is positional especially exacerbated when laying down (relieved with sitting up).   ECG with no acute ischemic changes  cardiac enzymes are negative  TTE reviewed - no evidence of cardiomyopathy, or pericardial fluid; with preserved LV function and no significant valvular pathology  BP appears to be well controlled with the nifedipine  Labs reviewed  no further cardiac work up needed as an in-patinet  will be followed as out pt in cardio-OB clinic  can take Tylenol for pain (would avoid NSAIDs in setting of the BP)

## 2024-01-29 NOTE — PATIENT PROFILE ADULT - LEGAL HELP
"SUBJECTIVE:     Darling Hartman is a 15 month old female, here for a routine health maintenance visit.    Patient was roomed by: Leeann Chavez    [facility]     Social History  Patient accompanied by:  Mother and father  Questions or concerns?: No    Forms to complete? No  Child lives with::  Mother and father  Who takes care of your child?:    Languages spoken in the home:  English  Recent family changes/ special stressors?:  None noted    Safety / Health Risk  Is your child around anyone who smokes?  No    TB Exposure:     No TB exposure    Car seat < 6 years old, in  back seat, rear-facing, 5-point restraint? Yes    Home Safety Survey:      Stairs Gated?:  Yes     Wood stove / Fireplace screened?  Not applicable     Poisons / cleaning supplies out of reach?:  Yes     Swimming pool?:  No     Firearms in the home?: No      Hearing / Vision  Hearing or vision concerns?  No concerns, hearing and vision subjectively normal    Daily Activities  Nutrition:  Good appetite, eats variety of foods, cows milk, cup and juice    Sleep      Sleep arrangement:crib    Sleep pattern: sleeps through the night    Elimination       Urinary frequency:4-6 times per 24 hours     Stool frequency: 1-3 times per 24 hours     Elimination problems:  None    Dental     Water source:  City water and well water    Dental provider: patient does not have a dental home    Dental exam in last 6 months: No       Dental visit recommended: Yes  Dental varnish done at 13 months; will repeat at 18 months.    DEVELOPMENT  Screening tool used, reviewed with parent/guardian: No screening tool used  Milestones (by observation/exam/report) 75-90% ile  PERSONAL/ SOCIAL/COGNITIVE:    Imitates actions    Drinks from cup    Plays ball with you  LANGUAGE:    2-4 words besides mama/ alexey     Shakes head for \"no\"    Hands object when asked to  GROSS MOTOR:    Walks without help - NO    Sury and recovers - NO    Climbs up on chair - yes  FINE MOTOR/ " "ADAPTIVE:    Scribbles    Turns pages of book     Uses spoon    PROBLEM LIST  Patient Active Problem List   Diagnosis     Term  delivered vaginally, current hospitalization     Congenital toe deformity     MEDICATIONS  No current outpatient medications on file.      ALLERGY  No Known Allergies    IMMUNIZATIONS  Immunization History   Administered Date(s) Administered     DTaP / Hep B / IPV 2018, 2018, 2018     Hep B, Peds or Adolescent 2018     HepA-ped 2 Dose 2019     Hib (PRP-T) 2018, 2018, 2018     Influenza Vaccine IM Ages 6-35 Months 4 Valent (PF) 2018, 2019     MMR 2019     Pneumo Conj 13-V (2010&after) 2018, 2018, 2018     Rotavirus, monovalent, 2-dose 2018, 2018     Varicella 2019       HEALTH HISTORY SINCE LAST VISIT  No surgery, major illness or injury since last physical exam    ROS  GENERAL:  NEGATIVE for fever, poor appetite, and sleep disruption.  SKIN:  NEGATIVE for rash, hives, and eczema.  EYE:  NEGATIVE for pain, discharge, redness, itching and vision problems.  ENT:  NEGATIVE for ear pain, runny nose, congestion and sore throat.  RESP:  NEGATIVE for cough, wheezing, and difficulty breathing.  CARDIAC:  NEGATIVE for chest pain and cyanosis.   GI:  NEGATIVE for vomiting, diarrhea, abdominal pain and constipation.  :  NEGATIVE for urinary problems.  NEURO:  NEGATIVE for headache and weakness.  ALLERGY:  As in Allergy History  MSK:  NEGATIVE for muscle problems and joint problems.    OBJECTIVE:   EXAM  Pulse 112   Temp 98.1  F (36.7  C) (Tympanic)   Resp 24   Ht 0.749 m (2' 5.5\")   Wt 9.253 kg (20 lb 6.4 oz)   HC 46.4 cm (18.25\")   BMI 16.48 kg/m    14 %ile based on WHO (Girls, 0-2 years) Length-for-age data based on Length recorded on 2019.  36 %ile based on WHO (Girls, 0-2 years) weight-for-age data based on Weight recorded on 2019.  68 %ile based on WHO (Girls, 0-2 years) " head circumference-for-age based on Head Circumference recorded on 5/1/2019.  GENERAL: Alert, well appearing, no distress  SKIN: Clear. No significant rash, abnormal pigmentation or lesions  HEAD: Normocephalic.  EYES:  Symmetric light reflex and no eye movement on cover/uncover test. Normal conjunctivae.  EARS: Normal canals. Tympanic membranes are normal; gray and translucent.  NOSE: Normal without discharge.  MOUTH/THROAT: Clear. No oral lesions. Teeth without obvious abnormalities.  NECK: Supple, no masses.  No thyromegaly.  LYMPH NODES: No adenopathy  LUNGS: Clear. No rales, rhonchi, wheezing or retractions  HEART: Regular rhythm. Normal S1/S2. No murmurs. Normal pulses.  ABDOMEN: Soft, non-tender, not distended, no masses or hepatosplenomegaly. Bowel sounds normal.   GENITALIA: Normal female external genitalia. Rodríguez stage I,  No inguinal herniae are present.  EXTREMITIES: Full range of motion, no deformities  NEUROLOGIC: No focal findings. Cranial nerves grossly intact: DTR's normal. Normal gait, strength and tone    ASSESSMENT/PLAN:   1. Encounter for routine child health examination w/o abnormal findings  - DTAP IMMUNIZATION (<7Y), IM [[id number]]  - HIB VACCINE, PRP-T, IM [[id number]]  - PNEUMOCOCCAL CONJ VACCINE 13 VALENT IM [[id number]]    Anticipatory Guidance  The following topics were discussed:  SOCIAL/ FAMILY:    Enforce a few rules consistently    Positive discipline    Tantrums  NUTRITION:    Healthy food choices    Age-related decrease in appetite    Limit juice to 4 ounces  HEALTH/ SAFETY:    Dental hygiene    Sunscreen/insect repellent    Never leave unattended    Exploration/ climbing    Chokable toys    Burns/ water temp.    Water safety    Preventive Care Plan  Immunizations     See orders in EpicCare.  I reviewed the signs and symptoms of adverse effects and when to seek medical care if they should arise.  Referrals/Ongoing Specialty care: No   See other orders in EpicCare    Resources:  [facility]  and Teen Checkups (C&TC) Schedule of Age-Related Screening Standards    FOLLOW-UP:      18 month Preventive Care visit    [facility]aine [facility]   no

## 2024-01-29 NOTE — CONSULT NOTE ADULT - ASSESSMENT
A/P: 32yo PPD#5 s/p  admitted with elevated BP meeting criteria for severe pre-ecclampsia.  Patient also presented with chest pain and had a negative cardiac lab work and normal CXR however continues to report chest pain.    # Preeclampsia  - BP now in good control    BP-> 100-130/70-80s,    Continue on Nifedipine ER 30 mg QD    Cardio OB to follow as outpatient when ready for discharge  - Completing IV Magnesium for seizure prophylaxis X 24 hours as per OB  - HELLP labs wnl, f/u AM HELLP      #Chest Pain  - Troponins -> normal range  -  BNP wnl  - CXR () - prelim: normal heart, clear lungs  - O2 sat on RA-   - Schedule Echocardiogram to rule out cardiomyopathy, pericardial effusion  - Symptoms may be consistent with pericarditis -> to be followed     A/P: 34yo PPD#5 s/p  admitted with elevated BP meeting criteria for severe pre-ecclampsia.  Patient also presented with chest pain and had a negative cardiac lab work and normal CXR however continues to report chest pain.    # Preeclampsia  - BP now in good control    BP-> 100-130/70-80s,    Continue on Nifedipine ER 30 mg QD    Cardio OB to follow as outpatient when ready for discharge  - Completing IV Magnesium for seizure prophylaxis X 24 hours as per OB  - HELLP labs wnl, f/u AM HELLP      #Chest Pain  - Troponins -> normal range  -  BNP wnl  - CXR () - normal heart, clear lungs  - O2 sat on RA-   - Schedule Echocardiogram to rule out cardiomyopathy, pericardial effusion  - Symptoms may be consistent with pericarditis -> to be followed

## 2024-01-29 NOTE — PATIENT PROFILE ADULT - MST SCORE
CT head/brain wo con



INDICATION / CLINICAL INFORMATION:

48 years Male; headache.



TECHNIQUE: Routine CT head without contrast. All CT scans at this location are performed using CT dos
e reduction for ALARA by means of automated exposure control.



COMPARISON: 

1/10/2014



FINDINGS:



BRAIN / INTRACRANIAL CONTENTS: Multiple, small lacunar infarcts seen in the anterior gangliocapsular 
regions. These findings are new from prior, but have a chronic appearance.



It would be difficult to evaluate for a small focus of acute ischemia without diffusion imaging by MR
I.



 Otherwise, no acute hemorrhage, mass effect, midline shift, hydrocephalus, or acute, large territori
al infarct. No signs of significant atrophy or chronic infarct. Mild, nonspecific white matter diseas
e noted, which has progressed from prior, as well.



CRANIOCERVICAL JUNCTION: No significant abnormality.



ORBITS: No significant abnormality of visualized orbits.

SINUSES / MASTOIDS: Small air-fluid level seen in the right sphenoid sinus. Mild mucosal thickening s
een in the ethmoids as well.



ADDITIONAL FINDINGS: None. 



IMPRESSION:

1. Progression and white matter and gangliocapsular disease as described above. Follow-up with diffus
ion imaging by MRI, as clinically warranted.

2. Otherwise, no focal mass, hemorrhage, hydrocephalus, or acute, large infarct appreciated. 

 



Signer Name: Kyle Malin MD, III 

Signed: 11/13/2020 4:33 PM

Workstation Name: [id number]

## 2024-01-29 NOTE — PROGRESS NOTE ADULT - ASSESSMENT
A/P: 32yo PPD#5 s/p  admitted with elevated BP meeting criteria for severe pre-ecclampsia s/p Hydral 5 now on Pro 30XL.  Blood pressure now normotensive and patient is on magnesium.    #sPEC  - -130/70-80s, ctm  - c/w Mg x24hrs  - HELLP labs wnl, f/u AM HELLP  - No signs or symptoms of PEC at this time    #Postpartum  - Pain well controlled, continue current pain regimen  - Increase ambulation, SCDs when not ambulating  - Continue regular diet    DERRCIK Corrales PGY3 A/P: 34yo PPD#5 s/p  admitted with elevated BP meeting criteria for severe pre-ecclampsia s/p Hydral 5 now on Pro 30XL.  Blood pressure now normotensive and patient is on magnesium. Patient also presented with chest pain and had a negative cardiac lab work and normal CXR however continues to report chest pain.    #sPEC  - -130/70-80s, ctm  - c/w Mg x24hrs  - HELLP labs wnl, f/u AM HELLP  - s/p Hydral 5IVP    #Chest Pain  - Troponin, BNP wnl  - CXR () - prelim: normal heart, clear lungs  - Satting well on room air  - Consider ECHO to r/o postpartum cardiomyopathy    #Postpartum  - Pain well controlled, continue current pain regimen  - Increase ambulation, SCDs when not ambulating  - Continue regular diet    DERRICK Corrales PGY3

## 2024-01-29 NOTE — CONSULT NOTE ADULT - SUBJECTIVE AND OBJECTIVE BOX
HISTORY OF PRESENT ILLNESS:    HPI:  HPI: Pt is a 33y  PPD#5 from [facility] c/b [facility] presenting with complaints of chest discomfort for ~2 days described as a burning and leg swelling, the latter of which prompted her to present for evaluation.         Ms. Blackman describes her chest discomfort as "sticking" that is uncomfortable when lying down and improves when upright.  She acknowledges that she had a significant GI upset associated with nausea and vomiting         just prior to delivery.          Stephy      OBHx: Above  GynHx: Fibroid, reportedly small (discovered during pregnancy)  PMHx: Denies  PSHx: Denies  Med: PNV  All: NKDA  Psych: Denies hx of mental health issues  SH: Denies hx of smoking, drinking, or drug usage during the pregnancy    Vital Signs Last 24 Hrs  T(C): 36.8 (2024 23:05), Max: 36.9 (2024 22:57)  T(F): 98.2 (2024 23:05), Max: 98.4 (2024 22:57)  HR: 60 (2024 23:15) (60 - 76)  BP: 167/96 (2024 23:15) (165/94 - 170/97)  BP(mean): 117 (2024 23:15) (115 - 117)  RR: 18 (2024 23:15) (18 - 20)  SpO2: 100% (2024 23:15) (100% - 100%)    Parameters below as of 2024 23:15  Patient On (Oxygen Delivery Method): room air    Gen: No acute distress. Awake. Alert  CV: Regular rate and rhythm. No murmurs appreciated  Pulm: Clear to auscultation bilaterally. No wheezes, crackles, or rhonchi  Abd: Soft. Fundus is firm  Extremities: Trace pitting edema of the ankles. No calf tenderness bilaterally  Neuro: 2+ brachial reflex            (2024 23:26)      OBSTETRICAL HISTORY    [ X] Previous pregnancy        :    X      PARITY:  X   ABORTIONS:    X     TERMINATIONS: X       CURRENT BIANCA PARTUM STATUS    [  ] pre eclampsia  [  ] gestational hypertension  [  ] gestational diabetes  [  ]  labor  [  ] Pre PROM  [  ] IUGR    REVIEW OF SYSTEMS:  CONSTITUTIONAL: No fever, weight loss, or fatigue  RESPIRATORY: No cough, wheezing, chills or hemoptysis; No Shortness of Breath  CARDIOVASCULAR: No chest pain, palpitations, passing out, dizziness, or leg swelling  NEUROLOGICAL: No headaches, memory loss, loss of strength, numbness, or tremors  PSYCHIATRIC: No depression, anxiety, mood swings, or difficulty sleeping    [ ] All others negative	  [ ] Unable to obtain    MEDICATIONS  (STANDING):  acetaminophen     Tablet .. 975 milliGRAM(s) Oral every 6 hours  diphtheria/tetanus/pertussis (acellular) Vaccine (Adacel) 0.5 milliLiter(s) IntraMuscular once  ibuprofen  Tablet. 600 milliGRAM(s) Oral every 6 hours  lactated ringers. 1000 milliLiter(s) (50 mL/Hr) IV Continuous <Continuous>  magnesium sulfate Infusion 2 Gm/Hr (50 mL/Hr) IV Continuous <Continuous>  NIFEdipine XL 30 milliGRAM(s) Oral daily  prenatal multivitamin 1 Tablet(s) Oral daily  sodium chloride 0.9% lock flush 3 milliLiter(s) IV Push every 8 hours    MEDICATIONS  (PRN):  benzocaine 20%/menthol 0.5% Spray 1 Spray(s) Topical every 6 hours PRN for Perineal discomfort  dibucaine 1% Ointment 1 Application(s) Topical every 6 hours PRN Perineal discomfort  diphenhydrAMINE 25 milliGRAM(s) Oral every 6 hours PRN Pruritus  hydrocortisone 1% Cream 1 Application(s) Topical every 6 hours PRN Moderate Pain (4-6)  lanolin Ointment 1 Application(s) Topical every 6 hours PRN nipple soreness  magnesium hydroxide Suspension 30 milliLiter(s) Oral two times a day PRN Constipation  pramoxine 1%/zinc 5% Cream 1 Application(s) Topical every 4 hours PRN Moderate Pain (4-6)  simethicone 80 milliGRAM(s) Chew every 4 hours PRN Gas  witch hazel Pads 1 Application(s) Topical every 4 hours PRN Perineal discomfort      PHYSICAL EXAM:    T(C): 36.5 (24 @ 14:01), Max: 36.9 (24 @ 22:57)  HR: 74 (24 @ 14:01) (60 - 100)  BP: 131/87 (24 @ 14:01) (106/73 - 170/97)  RR: 18 (24 @ 14:01) (18 - 20)  SpO2: 98% (24 @ 14:01) (96% - 100%)  I&O's Summary    2024 07:01  -  2024 07:00  --------------------------------------------------------  IN: 200 mL / OUT: 1850 mL / NET: -1650 mL        Appearance: Normal		  Cardiovascular: Normal S1 S2, No JVD, No murmurs, No edema  Respiratory: Lungs clear to auscultation	  Psychiatry: A & O x 3, Mood & affect appropriate  Gastrointestinal:  Soft, Non-tender, + BS		  Neurologic: Non-focal  Extremities: Normal range of motion, No clubbing, cyanosis or edema  Vascular: Peripheral pulses palpable 2+ bilaterally    TELEMETRY: 	    ECG:  	  RADIOLOGY:  OTHER: 	  	  LABS:	 	                                    9.9    8.68  )-----------( 223      ( 2024 06:29 )             29.2         139  |  106  |  5<L>  ----------------------------<  90  3.0<L>   |  23  |  0.44<L>    Ca    7.6<L>      2024 06:29  Mg     5.5         TPro  5.9<L>  /  Alb  3.5  /  TBili  0.2  /  DBili  x   /  AST  16  /  ALT  23  /  AlkPhos  95      proBNP:   HgA1c:   TSH:  	           HISTORY OF PRESENT ILLNESS:    Pt is a 33y  PPD#5 from [facility] c/b [facility] presented to [facility] yesterday with complaints of chest discomfort for ~2 days described as a burning and leg swelling postpartum.    Ms. Blackman described the chest discomfort as a "sticking" sensation that is increasingly uncomfortable when lying down and improves when sitting upright.  She acknowledges that she had a significant GI upset associated with nausea and vomiting just prior to her delivery.       Blood pressure was elevated on admission and met criteria for severe pre-eclampsia. Treated with Hydralazine  5 mg and  now on Procardia  30XL.   Preeclampsia treated with IV Magnesium drip for seizure prophylaxis.    Callled in consultation for BP management and ongoing chest discomfort         OBHx: Above  GynHx: Fibroid, reportedly small (discovered during pregnancy)  PMHx: Denies  PSHx: Denies  Med: PNV  All: NKDA  Psych: Denies hx of mental health issues  SH: Denies hx of smoking, drinking, or drug usage during the pregnancy          MEDICATIONS  (STANDING):  acetaminophen     Tablet .. 975 milliGRAM(s) Oral every 6 hours  diphtheria/tetanus/pertussis (acellular) Vaccine (Adacel) 0.5 milliLiter(s) IntraMuscular once  ibuprofen  Tablet. 600 milliGRAM(s) Oral every 6 hours  lactated ringers. 1000 milliLiter(s) (50 mL/Hr) IV Continuous <Continuous>  magnesium sulfate Infusion 2 Gm/Hr (50 mL/Hr) IV Continuous <Continuous>  NIFEdipine XL 30 milliGRAM(s) Oral daily  prenatal multivitamin 1 Tablet(s) Oral daily  sodium chloride 0.9% lock flush 3 milliLiter(s) IV Push every 8 hours    MEDICATIONS  (PRN):  benzocaine 20%/menthol 0.5% Spray 1 Spray(s) Topical every 6 hours PRN for Perineal discomfort  dibucaine 1% Ointment 1 Application(s) Topical every 6 hours PRN Perineal discomfort  diphenhydrAMINE 25 milliGRAM(s) Oral every 6 hours PRN Pruritus  hydrocortisone 1% Cream 1 Application(s) Topical every 6 hours PRN Moderate Pain (4-6)  lanolin Ointment 1 Application(s) Topical every 6 hours PRN nipple soreness  magnesium hydroxide Suspension 30 milliLiter(s) Oral two times a day PRN Constipation  pramoxine 1%/zinc 5% Cream 1 Application(s) Topical every 4 hours PRN Moderate Pain (4-6)  simethicone 80 milliGRAM(s) Chew every 4 hours PRN Gas  witch hazel Pads 1 Application(s) Topical every 4 hours PRN Perineal discomfort      PHYSICAL EXAM:    T(C): 36.5 (24 @ 14:01), Max: 36.9 (24 @ 22:57)  HR: 74 (24 @ 14:01) (60 - 100)  BP: 131/87 (24 @ 14:01) (106/73 - 170/97)  RR: 18 (24 @ 14:01) (18 - 20)  SpO2: 98% (24 @ 14:01) (96% - 100%)  I&O's Summary    2024 07:01  -  2024 07:00  --------------------------------------------------------  IN: 200 mL / OUT: 1850 mL / NET: -1650 mL      	  LABS:	 	                          9.9    8.68  )-----------( 223      ( 2024 06:29 )             29.2         139  |  106  |  5<L>  ----------------------------<  90  3.0<L>   |  23  |  0.44<L>    Ca    7.6<L>      2024 06:29  Mg     5.5         TPro  5.9<L>  /  Alb  3.5  /  TBili  0.2  /  DBili  x   /  AST  16  /  ALT  23  /  AlkPhos  95

## 2024-01-30 ENCOUNTER — TRANSCRIPTION ENCOUNTER (OUTPATIENT)
Age: 34
End: 2024-01-30

## 2024-01-30 VITALS
HEART RATE: 68 BPM | TEMPERATURE: 98 F | DIASTOLIC BLOOD PRESSURE: 82 MMHG | RESPIRATION RATE: 18 BRPM | SYSTOLIC BLOOD PRESSURE: 124 MMHG | OXYGEN SATURATION: 96 %

## 2024-01-30 PROCEDURE — 80076 HEPATIC FUNCTION PANEL: CPT

## 2024-01-30 PROCEDURE — 99285 EMERGENCY DEPT VISIT HI MDM: CPT | Mod: 25

## 2024-01-30 PROCEDURE — 93005 ELECTROCARDIOGRAM TRACING: CPT

## 2024-01-30 PROCEDURE — 80048 BASIC METABOLIC PNL TOTAL CA: CPT

## 2024-01-30 PROCEDURE — 93306 TTE W/DOPPLER COMPLETE: CPT

## 2024-01-30 PROCEDURE — 82962 GLUCOSE BLOOD TEST: CPT

## 2024-01-30 PROCEDURE — 85730 THROMBOPLASTIN TIME PARTIAL: CPT

## 2024-01-30 PROCEDURE — 83880 ASSAY OF NATRIURETIC PEPTIDE: CPT

## 2024-01-30 PROCEDURE — 83735 ASSAY OF MAGNESIUM: CPT

## 2024-01-30 PROCEDURE — 99253 IP/OBS CNSLTJ NEW/EST LOW 45: CPT | Mod: GC

## 2024-01-30 PROCEDURE — 80053 COMPREHEN METABOLIC PANEL: CPT

## 2024-01-30 PROCEDURE — 85384 FIBRINOGEN ACTIVITY: CPT

## 2024-01-30 PROCEDURE — 85610 PROTHROMBIN TIME: CPT

## 2024-01-30 PROCEDURE — 71045 X-RAY EXAM CHEST 1 VIEW: CPT

## 2024-01-30 PROCEDURE — 85025 COMPLETE CBC W/AUTO DIFF WBC: CPT

## 2024-01-30 PROCEDURE — 96374 THER/PROPH/DIAG INJ IV PUSH: CPT

## 2024-01-30 PROCEDURE — 84484 ASSAY OF TROPONIN QUANT: CPT

## 2024-01-30 PROCEDURE — 83615 LACTATE (LD) (LDH) ENZYME: CPT

## 2024-01-30 PROCEDURE — 84550 ASSAY OF BLOOD/URIC ACID: CPT

## 2024-01-30 PROCEDURE — 96375 TX/PRO/DX INJ NEW DRUG ADDON: CPT

## 2024-01-30 RX ORDER — NIFEDIPINE 30 MG
1 TABLET, EXTENDED RELEASE 24 HR ORAL
Qty: 0 | Refills: 0 | DISCHARGE
Start: 2024-01-30

## 2024-01-30 RX ADMIN — Medication 975 MILLIGRAM(S): at 09:27

## 2024-01-30 RX ADMIN — Medication 975 MILLIGRAM(S): at 08:57

## 2024-01-30 RX ADMIN — Medication 30 MILLIGRAM(S): at 09:00

## 2024-01-30 RX ADMIN — Medication 600 MILLIGRAM(S): at 00:23

## 2024-01-30 RX ADMIN — Medication 600 MILLIGRAM(S): at 00:18

## 2024-01-30 RX ADMIN — SODIUM CHLORIDE 3 MILLILITER(S): 9 INJECTION INTRAMUSCULAR; INTRAVENOUS; SUBCUTANEOUS at 05:46

## 2024-01-30 NOTE — DISCHARGE NOTE OB - PATIENT PORTAL LINK FT
You can access the FollowMyHealth Patient Portal offered by [facility] by registering at the following website: [email or web address]. By joining [facility]’s FollowMyHealth portal, you will also be able to view your health information using other applications (apps) compatible with our system.

## 2024-01-30 NOTE — DISCHARGE NOTE OB - CARE PLAN
Principal Discharge DX:	Preeclampsia in postpartum period  Assessment and plan of treatment:	TAKE BP QD. NOTIFY MD IF < 100/70 OR > 150/100. F/U IN OFFICE 2 WEEKS  Secondary Diagnosis:	Chest pain, midsternal   1

## 2024-01-30 NOTE — PROGRESS NOTE ADULT - SUBJECTIVE AND OBJECTIVE BOX
OB Progress Note:  PPD#6    S: 34yo  PPD#6 s/p . Patient feels well. Pain is well controlled. She is tolerating a regular diet and passing flatus. She is voiding spontaneously, and ambulating without difficulty. Denies CP/SOB. Denies HA/lightheadedness/dizziness. Denies N/V. Denies calf pain    O:  Vitals:   Vital Signs Last 24 Hrs  T(C): 36.7 (2024 00:15), Max: 36.8 (2024 20:00)  T(F): 98.1 (2024 00:15), Max: 98.2 (2024 20:00)  HR: 86 (2024 00:15) (72 - 100)  BP: 123/76 (2024 00:15) (121/82 - 135/87)  BP(mean): --  RR: 17 (2024 00:15) (17 - 18)  SpO2: 98% (2024 00:15) (96% - 98%)    Parameters below as of 2024 00:15  Patient On (Oxygen Delivery Method): room air        MEDICATIONS  (STANDING):  acetaminophen     Tablet .. 975 milliGRAM(s) Oral every 6 hours  diphtheria/tetanus/pertussis (acellular) Vaccine (Adacel) 0.5 milliLiter(s) IntraMuscular once  ibuprofen  Tablet. 600 milliGRAM(s) Oral every 6 hours  lactated ringers. 1000 milliLiter(s) (50 mL/Hr) IV Continuous <Continuous>  NIFEdipine XL 30 milliGRAM(s) Oral daily  prenatal multivitamin 1 Tablet(s) Oral daily  sodium chloride 0.9% lock flush 3 milliLiter(s) IV Push every 8 hours    MEDICATIONS  (PRN):  benzocaine 20%/menthol 0.5% Spray 1 Spray(s) Topical every 6 hours PRN for Perineal discomfort  dibucaine 1% Ointment 1 Application(s) Topical every 6 hours PRN Perineal discomfort  diphenhydrAMINE 25 milliGRAM(s) Oral every 6 hours PRN Pruritus  hydrocortisone 1% Cream 1 Application(s) Topical every 6 hours PRN Moderate Pain (4-6)  lanolin Ointment 1 Application(s) Topical every 6 hours PRN nipple soreness  magnesium hydroxide Suspension 30 milliLiter(s) Oral two times a day PRN Constipation  pramoxine 1%/zinc 5% Cream 1 Application(s) Topical every 4 hours PRN Moderate Pain (4-6)  simethicone 80 milliGRAM(s) Chew every 4 hours PRN Gas  witch hazel Pads 1 Application(s) Topical every 4 hours PRN Perineal discomfort      Labs:  Blood type: A Negative  Rubella IgG: RPR: Negative                          9.9<L>   8.68 >-----------< 223    (  @ 06:29 )             29.2<L>                        10.5<L>   11.22<H> >-----------< 250    (  @ 23:22 )             31.3<L>    24 @ 06:29      139  |  106  |  5<L>  ----------------------------<  90  3.0<L>   |  23  |  0.44<L>    24 @ 23:22      138  |  104  |  10  ----------------------------<  92  3.2<L>   |  21<L>  |  0.55        Ca    7.6<L>      2024 06:29  Ca    8.6      2024 23:22  Mg     5.7<H>       Mg     5.5<H>       Mg     4.9<H>       Mg     1.9         TPro  5.9<L>  /  Alb  3.5  /  TBili  0.2  /  DBili  x   /  AST  16  /  ALT  23  /  AlkPhos  95  24 @ 06:29  TPro  6.3  /  Alb  3.6  /  TBili  0.2  /  DBili  <0.1  /  AST  20  /  ALT  28  /  AlkPhos  106  24 @ 23:22          Physical Exam:  General: NAD  CV: RRR  Pulm: CTAB  Abdomen: soft, non-tender, non-distended, fundus firm  Vaginal: lochia wnl, exam deferred  Extremities: No erythema/calf tenderness

## 2024-01-30 NOTE — DISCHARGE NOTE OB - CARE PROVIDER_API CALL
Juancarlos Jefferson.  Obstetrics and Gynecology  [address], [address]  [place] [address]  Phone: [phone]  Fax: [phone]  Follow Up Time:

## 2024-01-30 NOTE — PROGRESS NOTE ADULT - ATTENDING COMMENTS
PT DOING WELL. WOULD LIKE TO GO HOME.  VSS  WILL D/C HOME. CONT. PROCARDIA 30 XL. PT TO TAKE BPS AT HOME & HOLD MEDS IF BP < 100 70.  SHE WILL CALL IF BP< 100/70   OR > 150/90.F/U OFFICE 2 WEEKS.    JAE AGUILAR

## 2024-01-30 NOTE — DISCHARGE NOTE OB - DISCHARGE DATE
Pharmacy faxed in request for medication refill. Medication(s) set up as pending orders from medication list.    Patient told to check with pharmacy after 48 hours.    Patient was advised they would be notified only if there is a problem concerning the refill. Preferred pharmacy has been set up and verified    Pt is requesting a 90 day supply.   30-Jan-2024

## 2024-01-30 NOTE — DISCHARGE NOTE OB - HOSPITAL COURSE
PT ADMITTED FOR PP PREECLAMPSIA. C/O CP & SOB. FOUND TO HAVE BPS IN SEVERE RANGE. GIVEN IV HYDRALAZINE, THEN PO PROCARDIA. CARDIAC W/U NEG. S/P MGSO4. D/C HD#2

## 2024-01-30 NOTE — DISCHARGE NOTE OB - MEDICATION SUMMARY - MEDICATIONS TO TAKE
I will START or STAY ON the medications listed below when I get home from the hospital:    ibuprofen 600 mg oral tablet  -- 1 tab(s) by mouth every 6 hours  -- Indication: For nsd    acetaminophen 325 mg oral tablet  -- 3 tab(s) by mouth every 6 hours  -- Indication: For Chest pain, midsternal    NIFEdipine 30 mg oral tablet, extended release  -- 1 tab(s) by mouth once a day  -- Indication: For Preeclampsia in postpartum period    Prenatal Multivitamins with Folic Acid 1 mg oral tablet  -- 1 tab(s) by mouth once a day  -- Indication: For nsd

## 2024-01-30 NOTE — PROGRESS NOTE ADULT - ASSESSMENT
A/P: 34yo PPD#6 s/p  admitted with elevated BP meeting criteria for severe pre-ecclampsia s/p Hydral 5 now on Pro 30XL.  Blood pressure now normotensive and patient is on magnesium. Patient also presented with chest pain and had a negative cardiac lab work and normal CXR however continues to report chest pain.    #sPEC  - -130/70-80s, ctm  - s/p Mg x24hrs  - HELLP labs wnl  - s/p Hydral 5 IVP ()    #Chest Pain - resolved  - Troponin, BNP wnl  - CXR () - prelim: normal heart, clear lungs  - ECHO (): wnl, EF 63%    #Postpartum  - Pain well controlled, continue current pain regimen  - Increase ambulation, SCDs when not ambulating  - Continue regular diet  - Discharge planning    DERRICK Corrales PGY3
